# Patient Record
Sex: MALE | Race: WHITE | NOT HISPANIC OR LATINO
[De-identification: names, ages, dates, MRNs, and addresses within clinical notes are randomized per-mention and may not be internally consistent; named-entity substitution may affect disease eponyms.]

---

## 2017-01-17 ENCOUNTER — APPOINTMENT (OUTPATIENT)
Dept: ELECTROPHYSIOLOGY | Facility: CLINIC | Age: 82
End: 2017-01-17

## 2017-04-19 ENCOUNTER — APPOINTMENT (OUTPATIENT)
Dept: ELECTROPHYSIOLOGY | Facility: CLINIC | Age: 82
End: 2017-04-19

## 2017-04-19 VITALS — SYSTOLIC BLOOD PRESSURE: 158 MMHG | DIASTOLIC BLOOD PRESSURE: 81 MMHG | HEART RATE: 60 BPM

## 2017-07-24 ENCOUNTER — APPOINTMENT (OUTPATIENT)
Dept: ELECTROPHYSIOLOGY | Facility: CLINIC | Age: 82
End: 2017-07-24
Payer: MEDICARE

## 2017-07-24 PROCEDURE — 93294 REM INTERROG EVL PM/LDLS PM: CPT

## 2017-07-24 PROCEDURE — 93296 REM INTERROG EVL PM/IDS: CPT

## 2017-10-25 ENCOUNTER — APPOINTMENT (OUTPATIENT)
Dept: ELECTROPHYSIOLOGY | Facility: CLINIC | Age: 82
End: 2017-10-25

## 2017-11-01 ENCOUNTER — APPOINTMENT (OUTPATIENT)
Dept: ELECTROPHYSIOLOGY | Facility: CLINIC | Age: 82
End: 2017-11-01
Payer: MEDICARE

## 2017-11-01 DIAGNOSIS — I49.5 SICK SINUS SYNDROME: ICD-10-CM

## 2017-11-01 PROCEDURE — 93280 PM DEVICE PROGR EVAL DUAL: CPT

## 2018-01-29 ENCOUNTER — APPOINTMENT (OUTPATIENT)
Dept: ELECTROPHYSIOLOGY | Facility: CLINIC | Age: 83
End: 2018-01-29
Payer: MEDICARE

## 2018-01-29 DIAGNOSIS — I48.0 PAROXYSMAL ATRIAL FIBRILLATION: ICD-10-CM

## 2018-01-29 PROCEDURE — 93294 REM INTERROG EVL PM/LDLS PM: CPT

## 2018-01-29 PROCEDURE — 93296 REM INTERROG EVL PM/IDS: CPT

## 2018-04-24 ENCOUNTER — APPOINTMENT (OUTPATIENT)
Dept: ELECTROPHYSIOLOGY | Facility: CLINIC | Age: 83
End: 2018-04-24

## 2018-07-30 ENCOUNTER — APPOINTMENT (OUTPATIENT)
Dept: ELECTROPHYSIOLOGY | Facility: CLINIC | Age: 83
End: 2018-07-30

## 2019-11-18 ENCOUNTER — APPOINTMENT (OUTPATIENT)
Dept: SURGERY | Facility: CLINIC | Age: 84
End: 2019-11-18
Payer: MEDICARE

## 2019-11-18 VITALS
HEART RATE: 84 BPM | BODY MASS INDEX: 26.06 KG/M2 | DIASTOLIC BLOOD PRESSURE: 92 MMHG | TEMPERATURE: 97.8 F | HEIGHT: 69.5 IN | WEIGHT: 180 LBS | SYSTOLIC BLOOD PRESSURE: 137 MMHG

## 2019-11-18 PROCEDURE — 99213 OFFICE O/P EST LOW 20 MIN: CPT

## 2019-11-19 ENCOUNTER — TRANSCRIPTION ENCOUNTER (OUTPATIENT)
Age: 84
End: 2019-11-19

## 2019-11-19 ENCOUNTER — OUTPATIENT (OUTPATIENT)
Dept: OUTPATIENT SERVICES | Facility: HOSPITAL | Age: 84
LOS: 1 days | End: 2019-11-19

## 2019-11-19 VITALS
OXYGEN SATURATION: 98 % | RESPIRATION RATE: 18 BRPM | DIASTOLIC BLOOD PRESSURE: 84 MMHG | TEMPERATURE: 98 F | HEART RATE: 77 BPM | WEIGHT: 182.98 LBS | SYSTOLIC BLOOD PRESSURE: 136 MMHG | HEIGHT: 68 IN

## 2019-11-19 DIAGNOSIS — N60.01 SOLITARY CYST OF RIGHT BREAST: ICD-10-CM

## 2019-11-19 DIAGNOSIS — N63.0 UNSPECIFIED LUMP IN UNSPECIFIED BREAST: ICD-10-CM

## 2019-11-19 DIAGNOSIS — Z98.890 OTHER SPECIFIED POSTPROCEDURAL STATES: Chronic | ICD-10-CM

## 2019-11-19 DIAGNOSIS — Z95.5 PRESENCE OF CORONARY ANGIOPLASTY IMPLANT AND GRAFT: Chronic | ICD-10-CM

## 2019-11-19 LAB
ANION GAP SERPL CALC-SCNC: 9 MMO/L — SIGNIFICANT CHANGE UP (ref 7–14)
BUN SERPL-MCNC: 26 MG/DL — HIGH (ref 7–23)
CALCIUM SERPL-MCNC: 9.9 MG/DL — SIGNIFICANT CHANGE UP (ref 8.4–10.5)
CHLORIDE SERPL-SCNC: 102 MMOL/L — SIGNIFICANT CHANGE UP (ref 98–107)
CO2 SERPL-SCNC: 29 MMOL/L — SIGNIFICANT CHANGE UP (ref 22–31)
CREAT SERPL-MCNC: 1.15 MG/DL — SIGNIFICANT CHANGE UP (ref 0.5–1.3)
GLUCOSE SERPL-MCNC: 67 MG/DL — LOW (ref 70–99)
HCT VFR BLD CALC: 46.7 % — SIGNIFICANT CHANGE UP (ref 39–50)
HGB BLD-MCNC: 15.2 G/DL — SIGNIFICANT CHANGE UP (ref 13–17)
MCHC RBC-ENTMCNC: 32.1 PG — SIGNIFICANT CHANGE UP (ref 27–34)
MCHC RBC-ENTMCNC: 32.5 % — SIGNIFICANT CHANGE UP (ref 32–36)
MCV RBC AUTO: 98.5 FL — SIGNIFICANT CHANGE UP (ref 80–100)
NRBC # FLD: 0 K/UL — SIGNIFICANT CHANGE UP (ref 0–0)
PLATELET # BLD AUTO: 197 K/UL — SIGNIFICANT CHANGE UP (ref 150–400)
PMV BLD: 12 FL — SIGNIFICANT CHANGE UP (ref 7–13)
POTASSIUM SERPL-MCNC: 4.5 MMOL/L — SIGNIFICANT CHANGE UP (ref 3.5–5.3)
POTASSIUM SERPL-SCNC: 4.5 MMOL/L — SIGNIFICANT CHANGE UP (ref 3.5–5.3)
RBC # BLD: 4.74 M/UL — SIGNIFICANT CHANGE UP (ref 4.2–5.8)
RBC # FLD: 12.4 % — SIGNIFICANT CHANGE UP (ref 10.3–14.5)
SODIUM SERPL-SCNC: 140 MMOL/L — SIGNIFICANT CHANGE UP (ref 135–145)
WBC # BLD: 8.59 K/UL — SIGNIFICANT CHANGE UP (ref 3.8–10.5)
WBC # FLD AUTO: 8.59 K/UL — SIGNIFICANT CHANGE UP (ref 3.8–10.5)

## 2019-11-19 NOTE — H&P PST ADULT - RS GEN PE MLT RESP DETAILS PC
clear to auscultation bilaterally/airway patent/breath sounds equal/respirations non-labored/good air movement/no chest wall tenderness/no intercostal retractions

## 2019-11-19 NOTE — H&P PST ADULT - NSICDXPASTSURGICALHX_GEN_ALL_CORE_FT
PAST SURGICAL HISTORY:  Bilat Ing Hernia s/p surgical repair    History of cardiac radiofrequency ablation 9/2019    Lumbar Disc Surgery Spinal stenosis  L5 S1 1996    Pacemaker Insertion 8/26/09 [employer], model #[id number]    RCT (Rotator Cuff Tear) left -, s/p surgical repair    S/P Carpal Tunnel Release left  2008, right 2010    S/P coronary artery stent placement 2016- [facility]    S/P LASIK Surgery 2002 bilateral eyes

## 2019-11-19 NOTE — H&P PST ADULT - NEGATIVE CARDIOVASCULAR SYMPTOMS
no peripheral edema/no orthopnea/no palpitations/no chest pain/no paroxysmal nocturnal dyspnea/no claudication/no dyspnea on exertion

## 2019-11-19 NOTE — H&P PST ADULT - NEGATIVE GENERAL GENITOURINARY SYMPTOMS
no hematuria/no renal colic/no flank pain L/no flank pain R/no urine discoloration/no incontinence/no urinary hesitancy/normal urinary frequency/no bladder infections/no gas in urine/no dysuria

## 2019-11-19 NOTE — H&P PST ADULT - NEGATIVE GENERAL SYMPTOMS
no weight loss/no sweating/no polyphagia/no fatigue/no chills/no polydipsia/no weight gain/no polyuria/no anorexia/no malaise/no fever

## 2019-11-19 NOTE — H&P PST ADULT - NSICDXPASTMEDICALHX_GEN_ALL_CORE_FT
PAST MEDICAL HISTORY:  Atrial Fibrillation     Bilateral Recurrent Inguinal Hernia s/p surgical repair    Depression     Herniated Disc     History of lump in breast right    History of Ventricular Tachycardia     Hyperlipemia     Hypertension     MVP (mitral valve prolapse)     Rotator Cuff Tear left , s/p  surgical repair

## 2019-11-19 NOTE — H&P PST ADULT - NEGATIVE ENMT SYMPTOMS
no post-nasal discharge/no nose bleeds/no recurrent cold sores/no hearing difficulty/no sinus symptoms/no nasal congestion/no nasal obstruction/no ear pain/no nasal discharge/no gum bleeding/no abnormal taste sensation

## 2019-11-19 NOTE — H&P PST ADULT - NEGATIVE NEUROLOGICAL SYMPTOMS
no generalized seizures/no focal seizures/no paresthesias/no facial palsy/no headache/no confusion/no loss of sensation/no difficulty walking/no transient paralysis/no weakness/no syncope/no vertigo/no loss of consciousness/no hemiparesis/no tremors

## 2019-11-19 NOTE — H&P PST ADULT - HISTORY OF PRESENT ILLNESS
83 yo male noted mass to right breat area "couple of weeks ago" pt was referred to Dr. Rizzo and now present in PST for preop evaluation for breast biopsy on 11/20/2019

## 2019-11-19 NOTE — H&P PST ADULT - NSICDXFAMILYHX_GEN_ALL_CORE_FT
FAMILY HISTORY:  Father  Still living? Unknown  Hypertension, Age at diagnosis: Age Unknown    Mother  Still living? Unknown  Hypertension, Age at diagnosis: Age Unknown

## 2019-11-20 ENCOUNTER — RESULT REVIEW (OUTPATIENT)
Age: 84
End: 2019-11-20

## 2019-11-20 ENCOUNTER — APPOINTMENT (OUTPATIENT)
Dept: SURGERY | Facility: AMBULATORY SURGERY CENTER | Age: 84
End: 2019-11-20

## 2019-11-20 ENCOUNTER — OUTPATIENT (OUTPATIENT)
Dept: OUTPATIENT SERVICES | Facility: HOSPITAL | Age: 84
LOS: 1 days | Discharge: HOME | End: 2019-11-20
Payer: MEDICARE

## 2019-11-20 VITALS
TEMPERATURE: 97 F | DIASTOLIC BLOOD PRESSURE: 86 MMHG | SYSTOLIC BLOOD PRESSURE: 152 MMHG | HEIGHT: 68 IN | HEART RATE: 82 BPM | OXYGEN SATURATION: 99 % | WEIGHT: 182.98 LBS | RESPIRATION RATE: 16 BRPM

## 2019-11-20 VITALS — TEMPERATURE: 98 F

## 2019-11-20 DIAGNOSIS — Z95.5 PRESENCE OF CORONARY ANGIOPLASTY IMPLANT AND GRAFT: Chronic | ICD-10-CM

## 2019-11-20 DIAGNOSIS — Z98.890 OTHER SPECIFIED POSTPROCEDURAL STATES: Chronic | ICD-10-CM

## 2019-11-20 DIAGNOSIS — N60.01 SOLITARY CYST OF RIGHT BREAST: ICD-10-CM

## 2019-11-20 PROBLEM — I34.1 NONRHEUMATIC MITRAL (VALVE) PROLAPSE: Chronic | Status: ACTIVE | Noted: 2019-11-19

## 2019-11-20 PROBLEM — Z87.898 PERSONAL HISTORY OF OTHER SPECIFIED CONDITIONS: Chronic | Status: ACTIVE | Noted: 2019-11-19

## 2019-11-20 PROCEDURE — 88305 TISSUE EXAM BY PATHOLOGIST: CPT | Mod: 26

## 2019-11-20 PROCEDURE — 19120 REMOVAL OF BREAST LESION: CPT | Mod: GC

## 2019-11-20 RX ORDER — METOPROLOL TARTRATE 50 MG
1 TABLET ORAL
Qty: 0 | Refills: 0 | DISCHARGE

## 2019-11-20 RX ORDER — APIXABAN 2.5 MG/1
1 TABLET, FILM COATED ORAL
Qty: 0 | Refills: 0 | DISCHARGE

## 2019-11-20 RX ORDER — SODIUM CHLORIDE 9 MG/ML
1000 INJECTION, SOLUTION INTRAVENOUS
Refills: 0 | Status: DISCONTINUED | OUTPATIENT
Start: 2019-11-20 | End: 2019-12-17

## 2019-11-20 RX ORDER — ASPIRIN/CALCIUM CARB/MAGNESIUM 324 MG
1 TABLET ORAL
Qty: 0 | Refills: 0 | DISCHARGE

## 2019-11-20 RX ORDER — ROSUVASTATIN CALCIUM 5 MG/1
1 TABLET ORAL
Qty: 0 | Refills: 0 | DISCHARGE

## 2019-11-20 RX ORDER — ESCITALOPRAM OXALATE 10 MG/1
1 TABLET, FILM COATED ORAL
Qty: 0 | Refills: 0 | DISCHARGE

## 2019-11-20 NOTE — ASU DISCHARGE PLAN (ADULT/PEDIATRIC) - CALL YOUR DOCTOR IF YOU HAVE ANY OF THE FOLLOWING:
Numbness, tingling, color or temperature change to extremity/Increased irritability or sluggishness/Unable to urinate/Inability to tolerate liquids or foods/Bleeding that does not stop/Pain not relieved by Medications

## 2019-11-20 NOTE — ASU DISCHARGE PLAN (ADULT/PEDIATRIC) - CARE PROVIDER_API CALL
Misael Rizzo)  ColonRectal Surgery; Surgery  [address] [place] [address]  Phone: [phone]  Fax: [phone]  Follow Up Time:

## 2019-11-22 LAB — SURGICAL PATHOLOGY STUDY: SIGNIFICANT CHANGE UP

## 2020-03-02 ENCOUNTER — APPOINTMENT (OUTPATIENT)
Dept: ORTHOPEDIC SURGERY | Facility: CLINIC | Age: 85
End: 2020-03-02
Payer: MEDICARE

## 2020-03-02 VITALS
HEART RATE: 93 BPM | BODY MASS INDEX: 26.06 KG/M2 | WEIGHT: 180 LBS | DIASTOLIC BLOOD PRESSURE: 87 MMHG | SYSTOLIC BLOOD PRESSURE: 136 MMHG | HEIGHT: 69.5 IN

## 2020-03-02 DIAGNOSIS — Z86.79 PERSONAL HISTORY OF OTHER DISEASES OF THE CIRCULATORY SYSTEM: ICD-10-CM

## 2020-03-02 DIAGNOSIS — Z78.9 OTHER SPECIFIED HEALTH STATUS: ICD-10-CM

## 2020-03-02 DIAGNOSIS — M19.042 PRIMARY OSTEOARTHRITIS, LEFT HAND: ICD-10-CM

## 2020-03-02 DIAGNOSIS — M65.342 TRIGGER FINGER, LEFT RING FINGER: ICD-10-CM

## 2020-03-02 DIAGNOSIS — M19.041 PRIMARY OSTEOARTHRITIS, RIGHT HAND: ICD-10-CM

## 2020-03-02 PROCEDURE — 20550 NJX 1 TENDON SHEATH/LIGAMENT: CPT | Mod: F3

## 2020-03-02 PROCEDURE — 99203 OFFICE O/P NEW LOW 30 MIN: CPT | Mod: 25

## 2023-05-18 ENCOUNTER — NON-APPOINTMENT (OUTPATIENT)
Age: 88
End: 2023-05-18

## 2023-08-08 ENCOUNTER — APPOINTMENT (OUTPATIENT)
Dept: OPHTHALMOLOGY | Facility: CLINIC | Age: 88
End: 2023-08-08
Payer: MEDICARE

## 2023-08-08 ENCOUNTER — NON-APPOINTMENT (OUTPATIENT)
Age: 88
End: 2023-08-08

## 2023-08-08 PROCEDURE — 92004 COMPRE OPH EXAM NEW PT 1/>: CPT

## 2023-09-07 ENCOUNTER — NON-APPOINTMENT (OUTPATIENT)
Age: 88
End: 2023-09-07

## 2023-09-13 ENCOUNTER — NON-APPOINTMENT (OUTPATIENT)
Age: 88
End: 2023-09-13

## 2023-09-13 ENCOUNTER — APPOINTMENT (OUTPATIENT)
Dept: ORTHOPEDIC SURGERY | Facility: CLINIC | Age: 88
End: 2023-09-13
Payer: MEDICARE

## 2023-09-13 VITALS
HEIGHT: 69 IN | HEART RATE: 80 BPM | BODY MASS INDEX: 26.66 KG/M2 | DIASTOLIC BLOOD PRESSURE: 78 MMHG | SYSTOLIC BLOOD PRESSURE: 110 MMHG | WEIGHT: 180 LBS

## 2023-09-13 DIAGNOSIS — M65.322 TRIGGER FINGER, LEFT INDEX FINGER: ICD-10-CM

## 2023-09-13 PROCEDURE — 99203 OFFICE O/P NEW LOW 30 MIN: CPT | Mod: 25

## 2023-09-13 PROCEDURE — 20550 NJX 1 TENDON SHEATH/LIGAMENT: CPT | Mod: LT

## 2023-09-13 RX ORDER — LIDOCAINE HYDROCHLORIDE 10 MG/ML
1 INJECTION, SOLUTION INFILTRATION; PERINEURAL
Refills: 0 | Status: COMPLETED | OUTPATIENT
Start: 2023-09-13

## 2023-09-13 RX ORDER — BETAMETHA AC,SOD PHOS/WATER/PF 6 MG/ML
6 (3-3) VIAL (ML) INJECTION
Qty: 1 | Refills: 0 | Status: COMPLETED | OUTPATIENT
Start: 2023-09-13

## 2023-09-13 RX ADMIN — LIDOCAINE HYDROCHLORIDE 0.5 %: 10 INJECTION, SOLUTION INFILTRATION; PERINEURAL at 00:00

## 2023-09-13 RX ADMIN — BETAMETHASONE ACETATE AND BETAMETHASONE SODIUM PHOSPHATE 1 MG/ML: 3; 3 INJECTION, SUSPENSION INTRA-ARTICULAR; INTRALESIONAL; INTRAMUSCULAR; SOFT TISSUE at 00:00

## 2023-10-04 ENCOUNTER — APPOINTMENT (OUTPATIENT)
Dept: ORTHOPEDIC SURGERY | Facility: CLINIC | Age: 88
End: 2023-10-04
Payer: MEDICARE

## 2023-10-04 VITALS — HEIGHT: 69 IN | BODY MASS INDEX: 26.66 KG/M2 | WEIGHT: 180 LBS

## 2023-10-04 DIAGNOSIS — M54.16 RADICULOPATHY, LUMBAR REGION: ICD-10-CM

## 2023-10-04 PROCEDURE — 99214 OFFICE O/P EST MOD 30 MIN: CPT

## 2024-06-10 ENCOUNTER — APPOINTMENT (OUTPATIENT)
Dept: PULMONOLOGY | Facility: CLINIC | Age: 89
End: 2024-06-10
Payer: MEDICARE

## 2024-06-10 VITALS — DIASTOLIC BLOOD PRESSURE: 84 MMHG | HEART RATE: 76 BPM | SYSTOLIC BLOOD PRESSURE: 138 MMHG | OXYGEN SATURATION: 94 %

## 2024-06-10 VITALS — WEIGHT: 178 LBS | HEIGHT: 69.5 IN | BODY MASS INDEX: 25.77 KG/M2

## 2024-06-10 DIAGNOSIS — I48.91 UNSPECIFIED ATRIAL FIBRILLATION: ICD-10-CM

## 2024-06-10 PROCEDURE — 71046 X-RAY EXAM CHEST 2 VIEWS: CPT

## 2024-06-10 PROCEDURE — 95012 NITRIC OXIDE EXP GAS DETER: CPT

## 2024-06-10 PROCEDURE — 99203 OFFICE O/P NEW LOW 30 MIN: CPT | Mod: 25

## 2024-06-10 PROCEDURE — 94060 EVALUATION OF WHEEZING: CPT

## 2024-06-10 RX ORDER — METHYLPREDNISOLONE 4 MG/1
4 TABLET ORAL
Qty: 1 | Refills: 1 | Status: DISCONTINUED | COMMUNITY
Start: 2023-10-04 | End: 2024-06-10

## 2024-06-10 RX ORDER — CEFPODOXIME PROXETIL 200 MG/1
200 TABLET, FILM COATED ORAL
Qty: 20 | Refills: 0 | Status: ACTIVE | COMMUNITY
Start: 2024-06-10 | End: 1900-01-01

## 2024-06-10 RX ORDER — DILTIAZEM HYDROCHLORIDE 180 MG/1
180 TABLET, EXTENDED RELEASE ORAL
Refills: 0 | Status: ACTIVE | COMMUNITY
Start: 2024-06-10

## 2024-06-10 RX ORDER — ATORVASTATIN CALCIUM 80 MG/1
TABLET, FILM COATED ORAL
Refills: 0 | Status: DISCONTINUED | COMMUNITY
End: 2024-06-10

## 2024-06-10 RX ORDER — SILODOSIN 8 MG/1
8 CAPSULE ORAL
Refills: 0 | Status: ACTIVE | COMMUNITY
Start: 2024-06-10

## 2024-06-11 PROBLEM — I48.91 ATRIAL FIBRILLATION, UNSPECIFIED TYPE: Status: ACTIVE | Noted: 2024-06-10

## 2024-06-11 NOTE — ASSESSMENT
[FreeTextEntry1] : Mr. ESTHER GOMEZ is an 89 year old male, history of Hypertension, Hyperlipidemia, atrial fibrillation. Reviewed recent chest x-ray which demonstrated an elevated left diaphragm, no indication of pneumonia. Chest x-ray today demonstrates no change compared to prior. Cough likely secondary to acute viral infection but appears to be improving with antibiotics.  Will need follow-up PFT

## 2024-06-11 NOTE — ADDENDUM
[FreeTextEntry1] : I, Magdalena Nelli, acted solely as a scribe for Dr. Enoc Velasquez M.D. on this date 06/10/2024.   All medical record entries made by the Scribe were at my, Dr. Enoc Velasquez M.D., direction and personally dictated by me on 06/10/2024. I have reviewed the chart and agree that the record accurately reflects my personal performance of the history, physical exam, assessment and plan. I have also personally directed, reviewed, and agreed with the chart.

## 2024-06-11 NOTE — HISTORY OF PRESENT ILLNESS
[Former] : former [TextBox_4] : ESTHER GOMEZ is a 89 year male with history of Hypertension, Hyperlipidemia, atrial fibrillation, who presents to the office for an initial evaluation. Patient reports of having symptoms of a cough that started 1 week ago which has been improving after being started on a course of antibiotics. He states that he currently takes Flonase on a daily basis. Patient reports that he is currently not taking any maintenance inhalers at this time. He denies of having any known Hx of pulmonary diseases.   He has a known elevated left hemidiaphragm based on a prior evaluation in 2018. [YearQuit] : 65

## 2024-07-01 ENCOUNTER — APPOINTMENT (OUTPATIENT)
Dept: PULMONOLOGY | Facility: CLINIC | Age: 89
End: 2024-07-01
Payer: MEDICARE

## 2024-07-01 VITALS — HEART RATE: 80 BPM | OXYGEN SATURATION: 96 % | SYSTOLIC BLOOD PRESSURE: 132 MMHG | DIASTOLIC BLOOD PRESSURE: 80 MMHG

## 2024-07-01 DIAGNOSIS — R05.1 ACUTE COUGH: ICD-10-CM

## 2024-07-01 LAB — POCT - HEMOGLOBIN (HGB), QUANTITATIVE, TRANSCUTANEOUS: 14.3

## 2024-07-01 PROCEDURE — 94727 GAS DIL/WSHOT DETER LNG VOL: CPT

## 2024-07-01 PROCEDURE — 99213 OFFICE O/P EST LOW 20 MIN: CPT | Mod: 25

## 2024-07-01 PROCEDURE — ZZZZZ: CPT

## 2024-07-01 PROCEDURE — 88738 HGB QUANT TRANSCUTANEOUS: CPT

## 2024-07-01 PROCEDURE — 94010 BREATHING CAPACITY TEST: CPT

## 2024-07-01 PROCEDURE — 94729 DIFFUSING CAPACITY: CPT

## 2025-02-11 ENCOUNTER — APPOINTMENT (OUTPATIENT)
Dept: UROLOGY | Facility: CLINIC | Age: 89
End: 2025-02-11

## 2025-04-14 ENCOUNTER — NON-APPOINTMENT (OUTPATIENT)
Age: 89
End: 2025-04-14

## 2025-04-17 ENCOUNTER — APPOINTMENT (OUTPATIENT)
Dept: PULMONOLOGY | Facility: CLINIC | Age: 89
End: 2025-04-17
Payer: MEDICARE

## 2025-04-17 VITALS
HEART RATE: 65 BPM | BODY MASS INDEX: 26.06 KG/M2 | DIASTOLIC BLOOD PRESSURE: 69 MMHG | SYSTOLIC BLOOD PRESSURE: 108 MMHG | HEIGHT: 69.5 IN | OXYGEN SATURATION: 96 % | WEIGHT: 180 LBS

## 2025-04-17 DIAGNOSIS — R04.2 HEMOPTYSIS: ICD-10-CM

## 2025-04-17 PROCEDURE — 71046 X-RAY EXAM CHEST 2 VIEWS: CPT

## 2025-04-17 PROCEDURE — G2211 COMPLEX E/M VISIT ADD ON: CPT

## 2025-04-17 PROCEDURE — 99214 OFFICE O/P EST MOD 30 MIN: CPT

## 2025-04-24 ENCOUNTER — APPOINTMENT (OUTPATIENT)
Dept: PULMONOLOGY | Facility: CLINIC | Age: 89
End: 2025-04-24

## 2025-04-25 ENCOUNTER — APPOINTMENT (OUTPATIENT)
Dept: PULMONOLOGY | Facility: CLINIC | Age: 89
End: 2025-04-25

## 2025-05-30 ENCOUNTER — INPATIENT (INPATIENT)
Facility: HOSPITAL | Age: 89
LOS: 4 days | Discharge: HOME | End: 2025-06-04
Attending: THORACIC SURGERY (CARDIOTHORACIC VASCULAR SURGERY) | Admitting: THORACIC SURGERY (CARDIOTHORACIC VASCULAR SURGERY)
Payer: MEDICARE

## 2025-05-30 ENCOUNTER — APPOINTMENT (OUTPATIENT)
Dept: PULMONOLOGY | Facility: CLINIC | Age: 89
End: 2025-05-30
Payer: MEDICARE

## 2025-05-30 VITALS
SYSTOLIC BLOOD PRESSURE: 130 MMHG | HEART RATE: 68 BPM | DIASTOLIC BLOOD PRESSURE: 88 MMHG | TEMPERATURE: 98 F | RESPIRATION RATE: 18 BRPM | HEIGHT: 69 IN | WEIGHT: 179.9 LBS | OXYGEN SATURATION: 95 %

## 2025-05-30 VITALS — DIASTOLIC BLOOD PRESSURE: 61 MMHG | SYSTOLIC BLOOD PRESSURE: 97 MMHG | OXYGEN SATURATION: 96 % | HEART RATE: 81 BPM

## 2025-05-30 DIAGNOSIS — R06.02 SHORTNESS OF BREATH: ICD-10-CM

## 2025-05-30 DIAGNOSIS — Z95.5 PRESENCE OF CORONARY ANGIOPLASTY IMPLANT AND GRAFT: Chronic | ICD-10-CM

## 2025-05-30 DIAGNOSIS — J90 PLEURAL EFFUSION, NOT ELSEWHERE CLASSIFIED: ICD-10-CM

## 2025-05-30 DIAGNOSIS — R91.8 OTHER NONSPECIFIC ABNORMAL FINDING OF LUNG FIELD: ICD-10-CM

## 2025-05-30 DIAGNOSIS — Z98.890 OTHER SPECIFIED POSTPROCEDURAL STATES: Chronic | ICD-10-CM

## 2025-05-30 DIAGNOSIS — Z96.641 PRESENCE OF RIGHT ARTIFICIAL HIP JOINT: Chronic | ICD-10-CM

## 2025-05-30 LAB
ALBUMIN SERPL ELPH-MCNC: 3.5 G/DL — SIGNIFICANT CHANGE UP (ref 3.3–5)
ALP SERPL-CCNC: 83 U/L — SIGNIFICANT CHANGE UP (ref 40–120)
ALT FLD-CCNC: 13 U/L — SIGNIFICANT CHANGE UP (ref 4–41)
ANION GAP SERPL CALC-SCNC: 10 MMOL/L — SIGNIFICANT CHANGE UP (ref 7–14)
APTT BLD: 33.8 SEC — SIGNIFICANT CHANGE UP (ref 26.1–36.8)
AST SERPL-CCNC: 21 U/L — SIGNIFICANT CHANGE UP (ref 4–40)
BASOPHILS # BLD AUTO: 0.05 K/UL — SIGNIFICANT CHANGE UP (ref 0–0.2)
BASOPHILS NFR BLD AUTO: 0.6 % — SIGNIFICANT CHANGE UP (ref 0–2)
BILIRUB SERPL-MCNC: 0.8 MG/DL — SIGNIFICANT CHANGE UP (ref 0.2–1.2)
BLOOD GAS VENOUS COMPREHENSIVE RESULT: SIGNIFICANT CHANGE UP
BUN SERPL-MCNC: 21 MG/DL — SIGNIFICANT CHANGE UP (ref 7–23)
CALCIUM SERPL-MCNC: 9.4 MG/DL — SIGNIFICANT CHANGE UP (ref 8.4–10.5)
CHLORIDE SERPL-SCNC: 103 MMOL/L — SIGNIFICANT CHANGE UP (ref 98–107)
CO2 SERPL-SCNC: 27 MMOL/L — SIGNIFICANT CHANGE UP (ref 22–31)
CREAT SERPL-MCNC: 1.17 MG/DL — SIGNIFICANT CHANGE UP (ref 0.5–1.3)
EGFR: 59 ML/MIN/1.73M2 — LOW
EGFR: 59 ML/MIN/1.73M2 — LOW
EOSINOPHIL # BLD AUTO: 0.23 K/UL — SIGNIFICANT CHANGE UP (ref 0–0.5)
EOSINOPHIL NFR BLD AUTO: 2.6 % — SIGNIFICANT CHANGE UP (ref 0–6)
GLUCOSE SERPL-MCNC: 84 MG/DL — SIGNIFICANT CHANGE UP (ref 70–99)
HCT VFR BLD CALC: 35.2 % — LOW (ref 39–50)
HGB BLD-MCNC: 11.8 G/DL — LOW (ref 13–17)
IANC: 6.77 K/UL — SIGNIFICANT CHANGE UP (ref 1.8–7.4)
IMM GRANULOCYTES NFR BLD AUTO: 0.7 % — SIGNIFICANT CHANGE UP (ref 0–0.9)
INR BLD: 1.31 RATIO — HIGH (ref 0.85–1.16)
LYMPHOCYTES # BLD AUTO: 0.9 K/UL — LOW (ref 1–3.3)
LYMPHOCYTES # BLD AUTO: 10.2 % — LOW (ref 13–44)
MCHC RBC-ENTMCNC: 32.5 PG — SIGNIFICANT CHANGE UP (ref 27–34)
MCHC RBC-ENTMCNC: 33.5 G/DL — SIGNIFICANT CHANGE UP (ref 32–36)
MCV RBC AUTO: 97 FL — SIGNIFICANT CHANGE UP (ref 80–100)
MONOCYTES # BLD AUTO: 0.79 K/UL — SIGNIFICANT CHANGE UP (ref 0–0.9)
MONOCYTES NFR BLD AUTO: 9 % — SIGNIFICANT CHANGE UP (ref 2–14)
NEUTROPHILS # BLD AUTO: 6.77 K/UL — SIGNIFICANT CHANGE UP (ref 1.8–7.4)
NEUTROPHILS NFR BLD AUTO: 76.9 % — SIGNIFICANT CHANGE UP (ref 43–77)
NRBC # BLD AUTO: 0 K/UL — SIGNIFICANT CHANGE UP (ref 0–0)
NRBC # FLD: 0 K/UL — SIGNIFICANT CHANGE UP (ref 0–0)
NRBC BLD AUTO-RTO: 0 /100 WBCS — SIGNIFICANT CHANGE UP (ref 0–0)
NT-PROBNP SERPL-SCNC: 1486 PG/ML — HIGH
PLATELET # BLD AUTO: 315 K/UL — SIGNIFICANT CHANGE UP (ref 150–400)
POTASSIUM SERPL-MCNC: 4.2 MMOL/L — SIGNIFICANT CHANGE UP (ref 3.5–5.3)
POTASSIUM SERPL-SCNC: 4.2 MMOL/L — SIGNIFICANT CHANGE UP (ref 3.5–5.3)
PROT SERPL-MCNC: 6.8 G/DL — SIGNIFICANT CHANGE UP (ref 6–8.3)
PROTHROM AB SERPL-ACNC: 15.6 SEC — HIGH (ref 9.9–13.4)
RBC # BLD: 3.63 M/UL — LOW (ref 4.2–5.8)
RBC # FLD: 13.5 % — SIGNIFICANT CHANGE UP (ref 10.3–14.5)
SODIUM SERPL-SCNC: 140 MMOL/L — SIGNIFICANT CHANGE UP (ref 135–145)
TROPONIN T, HIGH SENSITIVITY RESULT: 36 NG/L — SIGNIFICANT CHANGE UP
WBC # BLD: 8.8 K/UL — SIGNIFICANT CHANGE UP (ref 3.8–10.5)
WBC # FLD AUTO: 8.8 K/UL — SIGNIFICANT CHANGE UP (ref 3.8–10.5)

## 2025-05-30 PROCEDURE — 99285 EMERGENCY DEPT VISIT HI MDM: CPT | Mod: GC

## 2025-05-30 PROCEDURE — 71275 CT ANGIOGRAPHY CHEST: CPT | Mod: 26

## 2025-05-30 PROCEDURE — 99222 1ST HOSP IP/OBS MODERATE 55: CPT

## 2025-05-30 PROCEDURE — G2211 COMPLEX E/M VISIT ADD ON: CPT | Mod: PD

## 2025-05-30 PROCEDURE — 99215 OFFICE O/P EST HI 40 MIN: CPT

## 2025-05-30 RX ORDER — LOSARTAN POTASSIUM 100 MG/1
25 TABLET, FILM COATED ORAL DAILY
Refills: 0 | Status: DISCONTINUED | OUTPATIENT
Start: 2025-05-31 | End: 2025-06-04

## 2025-05-30 RX ORDER — PIPERACILLIN-TAZO-DEXTROSE,ISO 3.375G/5
3.38 IV SOLUTION, PIGGYBACK PREMIX FROZEN(ML) INTRAVENOUS EVERY 8 HOURS
Refills: 0 | Status: DISCONTINUED | OUTPATIENT
Start: 2025-05-31 | End: 2025-06-04

## 2025-05-30 RX ORDER — SILODOSIN 4 MG/1
1 CAPSULE ORAL
Refills: 0 | DISCHARGE

## 2025-05-30 RX ORDER — PIPERACILLIN-TAZO-DEXTROSE,ISO 3.375G/5
3.38 IV SOLUTION, PIGGYBACK PREMIX FROZEN(ML) INTRAVENOUS ONCE
Refills: 0 | Status: COMPLETED | OUTPATIENT
Start: 2025-05-31 | End: 2025-05-31

## 2025-05-30 RX ORDER — SENNA 187 MG
2 TABLET ORAL AT BEDTIME
Refills: 0 | Status: DISCONTINUED | OUTPATIENT
Start: 2025-05-30 | End: 2025-06-04

## 2025-05-30 RX ORDER — FUROSEMIDE 10 MG/ML
1 INJECTION INTRAMUSCULAR; INTRAVENOUS
Refills: 0 | DISCHARGE

## 2025-05-30 RX ORDER — LOSARTAN POTASSIUM 100 MG/1
1 TABLET, FILM COATED ORAL
Refills: 0 | DISCHARGE

## 2025-05-30 RX ORDER — TAMSULOSIN HYDROCHLORIDE 0.4 MG/1
0.8 CAPSULE ORAL AT BEDTIME
Refills: 0 | Status: DISCONTINUED | OUTPATIENT
Start: 2025-05-30 | End: 2025-06-04

## 2025-05-30 RX ORDER — ESCITALOPRAM OXALATE 20 MG/1
10 TABLET ORAL DAILY
Refills: 0 | Status: DISCONTINUED | OUTPATIENT
Start: 2025-05-30 | End: 2025-06-04

## 2025-05-30 RX ORDER — POLYETHYLENE GLYCOL 3350 17 G/17G
17 POWDER, FOR SOLUTION ORAL DAILY
Refills: 0 | Status: DISCONTINUED | OUTPATIENT
Start: 2025-05-30 | End: 2025-06-04

## 2025-05-30 RX ORDER — PIPERACILLIN-TAZO-DEXTROSE,ISO 3.375G/5
3.38 IV SOLUTION, PIGGYBACK PREMIX FROZEN(ML) INTRAVENOUS ONCE
Refills: 0 | Status: COMPLETED | OUTPATIENT
Start: 2025-05-30 | End: 2025-05-30

## 2025-05-30 RX ORDER — ROSUVASTATIN CALCIUM 20 MG/1
10 TABLET, FILM COATED ORAL DAILY
Refills: 0 | Status: DISCONTINUED | OUTPATIENT
Start: 2025-05-30 | End: 2025-06-04

## 2025-05-30 RX ORDER — HEPARIN SODIUM 1000 [USP'U]/ML
5000 INJECTION INTRAVENOUS; SUBCUTANEOUS EVERY 8 HOURS
Refills: 0 | Status: DISCONTINUED | OUTPATIENT
Start: 2025-05-30 | End: 2025-06-01

## 2025-05-30 RX ORDER — FUROSEMIDE 10 MG/ML
20 INJECTION INTRAMUSCULAR; INTRAVENOUS
Refills: 0 | Status: DISCONTINUED | OUTPATIENT
Start: 2025-05-31 | End: 2025-06-04

## 2025-05-30 RX ORDER — BISACODYL 5 MG
10 TABLET, DELAYED RELEASE (ENTERIC COATED) ORAL DAILY
Refills: 0 | Status: DISCONTINUED | OUTPATIENT
Start: 2025-05-30 | End: 2025-06-04

## 2025-05-30 RX ORDER — DILTIAZEM HYDROCHLORIDE 240 MG/1
1 TABLET, EXTENDED RELEASE ORAL
Refills: 0 | DISCHARGE

## 2025-05-30 RX ORDER — DILTIAZEM HYDROCHLORIDE 240 MG/1
180 TABLET, EXTENDED RELEASE ORAL DAILY
Refills: 0 | Status: DISCONTINUED | OUTPATIENT
Start: 2025-05-31 | End: 2025-06-04

## 2025-05-30 RX ORDER — ROSUVASTATIN CALCIUM 20 MG/1
10 TABLET, FILM COATED ORAL AT BEDTIME
Refills: 0 | Status: DISCONTINUED | OUTPATIENT
Start: 2025-05-30 | End: 2025-05-30

## 2025-05-30 RX ADMIN — HEPARIN SODIUM 5000 UNIT(S): 1000 INJECTION INTRAVENOUS; SUBCUTANEOUS at 21:37

## 2025-05-30 RX ADMIN — Medication 200 GRAM(S): at 16:38

## 2025-05-30 RX ADMIN — Medication 25 GRAM(S): at 20:20

## 2025-05-30 RX ADMIN — TAMSULOSIN HYDROCHLORIDE 0.8 MILLIGRAM(S): 0.4 CAPSULE ORAL at 21:36

## 2025-05-30 RX ADMIN — Medication 2 TABLET(S): at 21:36

## 2025-05-30 NOTE — ED PROVIDER NOTE - CARE PLAN
1 Principal Discharge DX:	Shortness of breath   Principal Discharge DX:	Pleural effusion  Secondary Diagnosis:	SOB (shortness of breath)

## 2025-05-30 NOTE — ED PROVIDER NOTE - ATTENDING CONTRIBUTION TO CARE
Pt was seen and evaluated by me. Pt is a 89 y/o male with PMHx of Bladder CA, CHF, CAD s/p stents, HTN, HLD, A-fib on Eliquis who presented to the ED after abnormal CT yesterday. Pt states he had a CT yesterday and it showed a pleural effusion/abscess with enlarging 1.8 cm soft tissue nodule at anterior left upper pleural service suspicious for metastasis. Pt was advised to come to the ED by  Dr. Price for further work up. Pt denies any fever, chills, nausea, vomiting, worsening SOB, chest pain, or abd pain.   VITALS: Vitals have been reviewed.  GEN APPEARANCE: Alert and cooperative, non-toxic appearing and in NAD  HEAD: Atraumatic, normocephalic.   EYES: PERRL.   EARS: Gross hearing intact.   NOSE: No nasal discharge.   THROAT: MMM. Oral cavity and pharynx normal.   CV: RRR, S1S2, no c/r/m/g. No cyanosis or pallor.   LUNGS: Decreased lung sounds at left base  ABDOMEN: Soft, NTND. No guarding or rebound.   MSK/EXT: Spine appears normal, no spine point tenderness. 4 extremities.   SKIN: Normal color for race, warm, dry and intact. No evidence of rash.  89 y/o male with PMHx of Bladder CA, CHF, CAD s/p stents, HTN, HLD, A-fib on Eliquis who presented to the ED after abnormal CT yesterday.   Concern for Pleural Effusion, Lung CA, PE  Will get labs and CT. Will require admission for further work up/management

## 2025-05-30 NOTE — H&P ADULT - NSICDXPASTSURGICALHX_GEN_ALL_CORE_FT
PAST SURGICAL HISTORY:  Bilat Ing Hernia s/p surgical repair    History of cardiac radiofrequency ablation 9/2019    History of total hip replacement, right     Lumbar Disc Surgery Spinal stenosis  L5 S1 1996    Pacemaker Insertion 8/26/09 [employer], model #[id number]    RCT (Rotator Cuff Tear) left -, s/p surgical repair    S/P Carpal Tunnel Release left  2008, right 2010    S/P coronary artery stent placement 2016- [facility]    S/P LASIK Surgery 2002 bilateral eyes    S/P mitral valve clip implantation

## 2025-05-30 NOTE — ED PROVIDER NOTE - NSICDXPASTSURGICALHX_GEN_ALL_CORE_FT
PAST SURGICAL HISTORY:  Bilat Ing Hernia s/p surgical repair    History of cardiac radiofrequency ablation 9/2019    Lumbar Disc Surgery Spinal stenosis  L5 S1 1996    Pacemaker Insertion 8/26/09 [employer] model #[id number]    RCT (Rotator Cuff Tear) left -, s/p surgical repair    S/P Carpal Tunnel Release left  2008, right 2010    S/P coronary artery stent placement 2016- [facility]    S/P LASIK Surgery 2002 bilateral eyes

## 2025-05-30 NOTE — ED PROVIDER NOTE - OBJECTIVE STATEMENT
89 year-old male patient past medical history of stage I bladder cancer, ischemic heart failure, CAD status post stents, hypertension, hyperlipidemia, A-fib compliant on Eliquis presents to ED for new CT chest with IV contrast findings on outpatient radiology ordered by pulmonologist Dr. Emerson Price.  CT chest on 5/29/25 showed new small left pleural effusion to left lower lobe loculated in appearance concern for lung abscess as well as enlarging 1.8 cm soft tissue nodule at anterior left upper pleural service suspicious for metastasis.  Dr. Price sent patient in for repeat imaging and to get evaluated by CT surgery for possible pleural fluid cytology.    Pt reports low grade temp 99, overall fatigue, decreased appetite. No recent weight loss. No chest pain. Endorses chronic sob, increased for the past year and states most likely due to HF.

## 2025-05-30 NOTE — H&P ADULT - NSICDXPASTMEDICALHX_GEN_ALL_CORE_FT
PAST MEDICAL HISTORY:  Atrial Fibrillation     Bilateral Recurrent Inguinal Hernia s/p surgical repair    Bladder cancer     Depression     Herniated Disc     History of lump in breast right    History of Ventricular Tachycardia     Hyperlipemia     Hypertension     MVP (mitral valve prolapse)     Rotator Cuff Tear left , s/p  surgical repair

## 2025-05-30 NOTE — ED PROVIDER NOTE - CONSIDERATION OF ADMISSION OBSERVATION
Consideration of Admission/Observation Given concern for pleural effusion/mass, will require admission for further work up/management

## 2025-05-30 NOTE — ED ADULT NURSE NOTE - OBJECTIVE STATEMENT
Pt received to rm 6 presents for ct scan of lungs as advised by his PMD. A&ox4, ambulatory at baseline skin intact respirations even and unlabored abd soft and nondistended nontender to palpation. 20g IV placed in rt arm labs drawn and sent, NSr on CM, endorsing mild productive cough ,states PMD concerned for PNA. denies chest pain, fever, chills, n/v, or any other symptoms.

## 2025-05-30 NOTE — ED PROVIDER NOTE - CLINICAL SUMMARY MEDICAL DECISION MAKING FREE TEXT BOX
Patient is afebrile, hemodynamically stable, saturating appropriately on room air, in no acute distress, speaking complete sentences.  Vitals nonactionable at this time.  CT chest with IV contrast on 5/29/2025 showed concern for nodule (malignancy), pleural effusion versus lung abscess to left lung.  Sent in by pulmonologist and requesting CT surgery consult.  Patient states he has increased shortness of breath chronically for the past year.  Etiology may be due to heart failure versus pulmonary cancer versus lung abscess.  Will repeat imaging for CT surgery however will get CTA chest to eval for PE since patient has history of bladder cancer.

## 2025-05-30 NOTE — ED PROVIDER NOTE - PHYSICAL EXAMINATION
PHYSICAL EXAM:    GENERAL: NAD  HEENT:  Atraumatic  CHEST/LUNG: Chest rise equal bilaterally, clear breath sounds to right lung, diminished to left lung  HEART: Regular rate and rhythm  EXTREMITIES:  Extremities warm  PSYCH: A&Ox3  SKIN: No obvious rashes or lesions

## 2025-05-30 NOTE — H&P ADULT - ASSESSMENT
89yo M with h/o Afib, heart failure, MVR, s/p Mitral clip, PPM, HLD, HLD, depression, [place], Bladder CA (currently undergoing weekly intra bladder chemo) has been seeing his outpt PCP and Pulmonologist( Dr. Price) for 1 month of cough, general malaise, low grade temp now found to have new small left pleural effusion to left lower lobe loculated in appearance concern for lung abscess as well as enlarging 1.8 cm soft tissue nodule at anterior left upper pleural service suspicious for metastasis.    Plan:  -As per Dr. Moore, admit to his service, Thoracic surgery [address] [place]  -F/u Labs  -F/u CT scan of chest  -Will start on Zosyn for broad spectrum coverage  -Echo  -EKG  -Will hold Eliquis for now pending possible surgical intervention  Above d/w pt and ER team.

## 2025-05-30 NOTE — H&P ADULT - NSHPPHYSICALEXAM_GEN_ALL_CORE
General: WD NAD  Neurology: A&Ox3, nonfocal, SANTIZO x 4  Eyes: PERRLA/ EOMI, Gross vision intact  ENT/Neck: Neck supple, trachea midline, No JVD, Gross hearing intact + [place], wearing bilat hearing aides  Respiratory: dec'd BS left mid to base with fine crackles  CV: RRR, S1S2, no murmurs, rubs or gallops  Abdominal: Soft, NT, ND +BS,   Extremities: Bilat compression stockings in place. Trace edema bilat, + peripheral pulses  Skin: No Rashes, Hematoma, Ecchymosis

## 2025-05-30 NOTE — CONSULT NOTE ADULT - ASSESSMENT
-----------------------------------------------------------  Interventional Radiology Brief Consult Note  -----------------------------------------------------------    Reason for Referral: Left pleural effusion drainage     Clinical Summary: 89y Male with pmh of afib, heart failure, MVR s/p mitral clip, PPM, HLD, depression, bladder CA (intra-bladder chemo weekly) who had cough, general malaise and low grade fevers for the past month and was found to have a left lower lobe loculated pleural effusion. IR is consulted for left pleural effusion drainage.     Vitals:  T(F): 98.3 (05-30-25 @ 15:45), Max: 98.3 (05-30-25 @ 15:45)  HR: 84 (05-30-25 @ 15:45) (68 - 94)  BP: 139/84 (05-30-25 @ 15:45) (130/88 - 146/80)  RR: 18 (05-30-25 @ 15:45) (17 - 18)  SpO2: 96% (05-30-25 @ 15:45) (95% - 97%)    Labs:           11.8  8.80)-----(315     (05-30-25 @ 12:40)         35.2     140 | 103 | 21  --------------------< 84     (05-30-25 @ 12:40)  4.2 | 27 | 1.17       PT: 15.6[H] 05-30-25 @ 12:40  aPTT: 33.8 05-30-25 @ 12:40   INR: 1.31[H] 05-30-25 @ 12:40    Imaging: CT chest 5/30/25 was reviewed     Assessment: 89y Male with history of bladder CA and low grade fevers, cough and general malaise with loculated left pleural effusion. IR is consulted for left pleural effusion drainage.     Recommendations:  - Plan for left pleural effusion drainage on Monday. Place IR procedure order under Dr. Dickey (procedure will not be added to the schedule without this order).   - Updated cbc, bmp and coags.   - NPO at midnight prior to procedure.   - Hold subq heparin night prior to and morning of procedure. Eliquis to be held 72 hours prior to procedure.   - Write IR pre procedure note.     --  Beverly Cunningham MD  Interventional Radiology Resident (PGY-6)  Available on [employer] TEAMS    For EMERGENT inquiries/questions:  IR Pager ([facility]): [phone]  IR Pager ([facility]): [phone] ; [phone]    For non-emergent consults/questions:   Please place a sunrise order "Consult- Interventional Radiology" with an appropriate callback number    For questions about scheduling during appropriate work hours, call IR :  [facility]: [phone]  [facility]: [phone]    For outpatient IR booking:  [facility]: [phone]  [facility]: [phone]

## 2025-05-30 NOTE — PATIENT PROFILE ADULT - FALL HARM RISK - HARM RISK INTERVENTIONS

## 2025-05-30 NOTE — ED ADULT NURSE NOTE - CHIEF COMPLAINT QUOTE
pt c/o SOB x 2 weeks, sent from MD office for CT due to nodules found on lungs.  As per pt low grade fever at home.  Hx of HTN, A.fibb, pacemaker. Pt takes Eliquis daily.

## 2025-05-30 NOTE — ED ADULT TRIAGE NOTE - CHIEF COMPLAINT QUOTE
pt c/o SOB x 2 weeks, sent from MD office for CT due to nodules found on lungs.  As per pt low grade fever at home.  Hx of HTN, A.fibb. Pt takes Eliquis daily. pt c/o SOB x 2 weeks, sent from MD office for CT due to nodules found on lungs.  As per pt low grade fever at home.  Hx of HTN, A.fibb, pacemaker. Pt takes Eliquis daily.

## 2025-05-30 NOTE — H&P ADULT - HISTORY OF PRESENT ILLNESS
89yo M with h/o Afib, heart failure, MVR, s/p Mitral clip, PPM, HLD, HLD, depression, [place], Bladder CA (currently undergoing weekly intra bladder chemo) has been seeing his outpt PCP and Pulmonologist( Dr. Price) for 1 month c/o generally not feeling well. Pt states 3-4 wks ago he felt like he was sick with a cold or URI, experiencing cough, low grade fever, malaise and has continued to have cough with fatigue and occasional SOB since. Pt states cough is mostly dry, worse at night. Occasionally productive for sml amt of sputum. Pt states low grade temps of 99.0-100.0 on and off for past week. Denies any HA, night sweats, chills, hemoptysis, chest pain, pain, abd pain, n/v/d or wt loss/loss of appetite. Pt states chronic LE edema in which he takes Lasix for daily and adjusts his daily dose based on extent of the edema. Did not take Lasix this morning. Pt also confirms he take Eliquis BID for afib, last dose was this morning. Pt states most of his care and physicians through [facility]. Had outpt CT scan  chest on 5/29/25 showed new small left pleural effusion to left lower lobe loculated in appearance concern for lung abscess as well as enlarging 1.8 cm soft tissue nodule at anterior left upper pleural service suspicious for metastasis. Pt sent by Dr. Price to [facility] for Thoracic surgery admission and work up of CT scan findings. Pt states was given Keflex and Z pack by PCP yesterday but did not start taking.

## 2025-05-31 LAB
ANION GAP SERPL CALC-SCNC: 11 MMOL/L — SIGNIFICANT CHANGE UP (ref 7–14)
BUN SERPL-MCNC: 17 MG/DL — SIGNIFICANT CHANGE UP (ref 7–23)
CALCIUM SERPL-MCNC: 9.3 MG/DL — SIGNIFICANT CHANGE UP (ref 8.4–10.5)
CHLORIDE SERPL-SCNC: 105 MMOL/L — SIGNIFICANT CHANGE UP (ref 98–107)
CO2 SERPL-SCNC: 24 MMOL/L — SIGNIFICANT CHANGE UP (ref 22–31)
CREAT SERPL-MCNC: 1.21 MG/DL — SIGNIFICANT CHANGE UP (ref 0.5–1.3)
EGFR: 57 ML/MIN/1.73M2 — LOW
EGFR: 57 ML/MIN/1.73M2 — LOW
GLUCOSE SERPL-MCNC: 91 MG/DL — SIGNIFICANT CHANGE UP (ref 70–99)
HCT VFR BLD CALC: 36.7 % — LOW (ref 39–50)
HGB BLD-MCNC: 11.8 G/DL — LOW (ref 13–17)
INR BLD: 1.22 RATIO — HIGH (ref 0.85–1.16)
MCHC RBC-ENTMCNC: 32.2 G/DL — SIGNIFICANT CHANGE UP (ref 32–36)
MCHC RBC-ENTMCNC: 32.4 PG — SIGNIFICANT CHANGE UP (ref 27–34)
MCV RBC AUTO: 100.8 FL — HIGH (ref 80–100)
NRBC # BLD AUTO: 0 K/UL — SIGNIFICANT CHANGE UP (ref 0–0)
NRBC # FLD: 0 K/UL — SIGNIFICANT CHANGE UP (ref 0–0)
NRBC BLD AUTO-RTO: 0 /100 WBCS — SIGNIFICANT CHANGE UP (ref 0–0)
PLATELET # BLD AUTO: 283 K/UL — SIGNIFICANT CHANGE UP (ref 150–400)
POTASSIUM SERPL-MCNC: 4.4 MMOL/L — SIGNIFICANT CHANGE UP (ref 3.5–5.3)
POTASSIUM SERPL-SCNC: 4.4 MMOL/L — SIGNIFICANT CHANGE UP (ref 3.5–5.3)
PROTHROM AB SERPL-ACNC: 14.1 SEC — HIGH (ref 9.9–13.4)
RBC # BLD: 3.64 M/UL — LOW (ref 4.2–5.8)
RBC # FLD: 13.7 % — SIGNIFICANT CHANGE UP (ref 10.3–14.5)
SODIUM SERPL-SCNC: 140 MMOL/L — SIGNIFICANT CHANGE UP (ref 135–145)
WBC # BLD: 6.47 K/UL — SIGNIFICANT CHANGE UP (ref 3.8–10.5)
WBC # FLD AUTO: 6.47 K/UL — SIGNIFICANT CHANGE UP (ref 3.8–10.5)

## 2025-05-31 PROCEDURE — 71045 X-RAY EXAM CHEST 1 VIEW: CPT | Mod: 26

## 2025-05-31 PROCEDURE — 99233 SBSQ HOSP IP/OBS HIGH 50: CPT

## 2025-05-31 PROCEDURE — 99233 SBSQ HOSP IP/OBS HIGH 50: CPT | Mod: 57

## 2025-05-31 PROCEDURE — 99232 SBSQ HOSP IP/OBS MODERATE 35: CPT

## 2025-05-31 RX ORDER — ALPRAZOLAM 0.5 MG
0.12 TABLET, EXTENDED RELEASE 24 HR ORAL ONCE
Refills: 0 | Status: DISCONTINUED | OUTPATIENT
Start: 2025-05-31 | End: 2025-05-31

## 2025-05-31 RX ADMIN — ESCITALOPRAM OXALATE 10 MILLIGRAM(S): 20 TABLET ORAL at 12:53

## 2025-05-31 RX ADMIN — Medication 0.12 MILLIGRAM(S): at 00:47

## 2025-05-31 RX ADMIN — FUROSEMIDE 20 MILLIGRAM(S): 10 INJECTION INTRAMUSCULAR; INTRAVENOUS at 05:35

## 2025-05-31 RX ADMIN — DILTIAZEM HYDROCHLORIDE 180 MILLIGRAM(S): 240 TABLET, EXTENDED RELEASE ORAL at 05:35

## 2025-05-31 RX ADMIN — Medication 20 MILLIEQUIVALENT(S): at 12:54

## 2025-05-31 RX ADMIN — Medication 0.12 MILLIGRAM(S): at 21:54

## 2025-05-31 RX ADMIN — Medication 25 GRAM(S): at 04:27

## 2025-05-31 RX ADMIN — Medication 25 GRAM(S): at 21:53

## 2025-05-31 RX ADMIN — HEPARIN SODIUM 5000 UNIT(S): 1000 INJECTION INTRAVENOUS; SUBCUTANEOUS at 12:52

## 2025-05-31 RX ADMIN — HEPARIN SODIUM 5000 UNIT(S): 1000 INJECTION INTRAVENOUS; SUBCUTANEOUS at 05:36

## 2025-05-31 RX ADMIN — Medication 2 TABLET(S): at 21:54

## 2025-05-31 RX ADMIN — Medication 40 MILLIGRAM(S): at 05:35

## 2025-05-31 RX ADMIN — HEPARIN SODIUM 5000 UNIT(S): 1000 INJECTION INTRAVENOUS; SUBCUTANEOUS at 21:54

## 2025-05-31 RX ADMIN — ROSUVASTATIN CALCIUM 10 MILLIGRAM(S): 20 TABLET, FILM COATED ORAL at 05:45

## 2025-05-31 RX ADMIN — LOSARTAN POTASSIUM 25 MILLIGRAM(S): 100 TABLET, FILM COATED ORAL at 05:35

## 2025-05-31 RX ADMIN — Medication 25 GRAM(S): at 12:53

## 2025-05-31 NOTE — CONSULT NOTE ADULT - PROVIDER SPECIALTY LIST ADULT
Progress Note  Adonis Gold Patient Status:  Emergency    1979 MRN [id number]   Location [facility] EMERGENCY DEPARTMENT Attending Edwar Lazcano MD   Hosp Day # 0 PCP None Pcp     Paged by Dr. Lazcano, ED Physician    HPI:  44 year old male with PMH of dilated cardiomyopathy and HTN who presented to the ED with c/o dyspnea. Pt reported running out of torsemide. Pt currently on toresmide 40 mg BID. Work up in ED showed fluid overload. IV Lasix was given.       Labs:  Troponin negative  BNP 1,311      Diagnostics:   EKG with no acute ischemic changes  CXR with no acute findings      Assessment/Plan:    - Disposition as per ED Physician  - Discharged home for outpatient cardiology follow up      ERNIE Craig  [facility]  2024  10:48 PM  
Pulmonology
Intervent Radiology

## 2025-05-31 NOTE — PROGRESS NOTE ADULT - SUBJECTIVE AND OBJECTIVE BOX
Subjective: patient seen and examined with thoracic surgery team  pt without new complaints  overall not feeling well, persistent cough  pain controlled  using incentive spirometer  on bowel regimen, +flatus, +BM  ambulatory with assistance  d/w attending on morning TEAMS report      Vital Signs:  Vital Signs Last 24 Hrs  T(C): 36.5 (05-31-25 @ 04:39), Max: 37 (05-31-25 @ 00:38)  T(F): 97.7 (05-31-25 @ 04:39), Max: 98.6 (05-31-25 @ 00:38)  HR: 69 (05-31-25 @ 04:39) (64 - 94)  BP: 140/85 (05-31-25 @ 04:39) (129/77 - 146/80)  RR: 18 (05-31-25 @ 04:39) (17 - 18)  SpO2: 96% (05-31-25 @ 04:39) (95% - 98%) on (O2)    PE  Telemetry/Alarms: Afib  General: awake and alert NAD  Neurology: A&Ox3, nonfocal, SANTIZO x 4  Respiratory: CTA B/L  CV: RRR, S1S2, no murmurs, rubs or gallops  Abdominal: Soft, NT, ND +BS, Last BM  Extremities: No edema, + peripheral pulses    Relevant labs, radiology and Medications reviewed                        11.8   6.47  )-----------( 283      ( 31 May 2025 06:02 )             36.7     05-31    140  |  105  |  17  ----------------------------<  91  4.4   |  24  |  1.21    Ca    9.3      31 May 2025 06:02    TPro  6.8  /  Alb  3.5  /  TBili  0.8  /  DBili  x   /  AST  21  /  ALT  13  /  AlkPhos  83  05-30    PT/INR - ( 31 May 2025 06:02 )   PT: 14.1 sec;   INR: 1.22 ratio         PTT - ( 30 May 2025 12:40 )  PTT:33.8 sec  MEDICATIONS  (STANDING):  diltiazem    milliGRAM(s) Oral daily  escitalopram 10 milliGRAM(s) Oral daily  furosemide    Tablet 20 milliGRAM(s) Oral two times a day  heparin   Injectable 5000 Unit(s) SubCutaneous every 8 hours  losartan 25 milliGRAM(s) Oral daily  pantoprazole    Tablet 40 milliGRAM(s) Oral before breakfast  piperacillin/tazobactam IVPB.. 3.375 Gram(s) IV Intermittent every 8 hours  polyethylene glycol 3350 17 Gram(s) Oral daily  potassium chloride    Tablet ER 20 milliEquivalent(s) Oral daily  rosuvastatin 10 milliGRAM(s) Oral daily  senna 2 Tablet(s) Oral at bedtime  tamsulosin 0.8 milliGRAM(s) Oral at bedtime    MEDICATIONS  (PRN):  bisacodyl Suppository 10 milliGRAM(s) Rectal daily PRN Constipation    Pertinent Physical Exam  I&O's Summary    30 May 2025 07:01  -  31 May 2025 07:00  --------------------------------------------------------  IN: 680 mL / OUT: 1100 mL / NET: -420 mL      SOCIAL HISTORY  · Substance use	No  · Social History (marital status, living situation, occupation, and sexual history)	, lives with wife  Quit smoking at 23yrs old  Last ETOH over 1yr ago      PAST MEDICAL & SURGICAL HISTORY:  Atrial Fibrillation      Hypertension      Hyperlipemia      Herniated Disc      History of Ventricular Tachycardia      Depression      Bilateral Recurrent Inguinal Hernia  s/p surgical repair      Rotator Cuff Tear  left , s/p  surgical repair      History of lump in breast  right      MVP (mitral valve prolapse)      Bladder cancer      Bilat Ing Hernia  s/p surgical repair      RCT (Rotator Cuff Tear)  left -, s/p surgical repair      Pacemaker Insertion  8/26/09 Medtronic, model #[id number]      Lumbar Disc Surgery  Spinal stenosis  L5 S1 1996      S/P LASIK Surgery  2002 bilateral eyes      S/P Carpal Tunnel Release  left  2008, right 2010      History of cardiac radiofrequency ablation  9/2019      S/P coronary artery stent placement  2016- [facility]      S/P mitral valve clip implantation      History of total hip replacement, right      ASSESSMENT  89yo M with h/o Afib, heart failure, MVR, s/p Mitral clip, PPM, HLD, HLD, depression, [place], Bladder CA (currently undergoing weekly intra bladder chemo) has been seeing his outpt PCP and Pulmonologist( Dr. Price) for 1 month of cough, general malaise, low grade temp now found to have new small left pleural effusion to left lower lobe loculated in appearance concern for lung abscess as well as enlarging 1.8 cm soft tissue nodule at anterior left upper pleural service suspicious for metastasis.    PLAN  Neuro: Pain management  Pulm: Encourage coughing, deep breathing and use of incentive spirometry.   pulmonary consult, Dr Amor  Cardio: Monitor telemetry/alarms; TTE ordered  GI: Tolerating diet. Continue stool softeners.  Renal: monitor urine output, supplement electrolytes as needed  Vasc: Heparin SC/SCDs for DVT prophylaxis  Heme: Stable H/H. .   ID: zosyn, f/u sputum cs  Therapy: OOB/ambulate  Tubes: Monitor Chest tube output  Disposition: Plan for IR placement of PTC on Monday  Discussed with Cardiothoracic Team at AM rounds.      Subjective: patient seen and examined with thoracic surgery team  pt without new complaints  overall not feeling well, persistent cough  pain controlled  using incentive spirometer  on bowel regimen, +flatus, +BM  ambulatory with assistance  d/w attending on morning TEAMS report      Vital Signs:  Vital Signs Last 24 Hrs  T(C): 36.5 (05-31-25 @ 04:39), Max: 37 (05-31-25 @ 00:38)  T(F): 97.7 (05-31-25 @ 04:39), Max: 98.6 (05-31-25 @ 00:38)  HR: 69 (05-31-25 @ 04:39) (64 - 94)  BP: 140/85 (05-31-25 @ 04:39) (129/77 - 146/80)  RR: 18 (05-31-25 @ 04:39) (17 - 18)  SpO2: 96% (05-31-25 @ 04:39) (95% - 98%) on (O2)    PE  Telemetry/Alarms: Afib  General: awake and alert NAD  Neurology: A&Ox3, nonfocal, SANTIZO x 4  Respiratory: CTA B/L  CV: RRR, S1S2, no murmurs, rubs or gallops  Abdominal: Soft, NT, ND +BS, Last BM  Extremities: No edema, + peripheral pulses    Relevant labs, radiology and Medications reviewed                        11.8   6.47  )-----------( 283      ( 31 May 2025 06:02 )             36.7     05-31    140  |  105  |  17  ----------------------------<  91  4.4   |  24  |  1.21    Ca    9.3      31 May 2025 06:02    TPro  6.8  /  Alb  3.5  /  TBili  0.8  /  DBili  x   /  AST  21  /  ALT  13  /  AlkPhos  83  05-30    PT/INR - ( 31 May 2025 06:02 )   PT: 14.1 sec;   INR: 1.22 ratio         PTT - ( 30 May 2025 12:40 )  PTT:33.8 sec  MEDICATIONS  (STANDING):  diltiazem    milliGRAM(s) Oral daily  escitalopram 10 milliGRAM(s) Oral daily  furosemide    Tablet 20 milliGRAM(s) Oral two times a day  heparin   Injectable 5000 Unit(s) SubCutaneous every 8 hours  losartan 25 milliGRAM(s) Oral daily  pantoprazole    Tablet 40 milliGRAM(s) Oral before breakfast  piperacillin/tazobactam IVPB.. 3.375 Gram(s) IV Intermittent every 8 hours  polyethylene glycol 3350 17 Gram(s) Oral daily  potassium chloride    Tablet ER 20 milliEquivalent(s) Oral daily  rosuvastatin 10 milliGRAM(s) Oral daily  senna 2 Tablet(s) Oral at bedtime  tamsulosin 0.8 milliGRAM(s) Oral at bedtime    MEDICATIONS  (PRN):  bisacodyl Suppository 10 milliGRAM(s) Rectal daily PRN Constipation    Pertinent Physical Exam  I&O's Summary    30 May 2025 07:01  -  31 May 2025 07:00  --------------------------------------------------------  IN: 680 mL / OUT: 1100 mL / NET: -420 mL      SOCIAL HISTORY  · Substance use	No  · Social History (marital status, living situation, occupation, and sexual history)	, lives with wife  Quit smoking at 23yrs old  Last ETOH over 1yr ago      PAST MEDICAL & SURGICAL HISTORY:  Atrial Fibrillation      Hypertension      Hyperlipemia      Herniated Disc      History of Ventricular Tachycardia      Depression      Bilateral Recurrent Inguinal Hernia  s/p surgical repair      Rotator Cuff Tear  left , s/p  surgical repair      History of lump in breast  right      MVP (mitral valve prolapse)      Bladder cancer      Bilat Ing Hernia  s/p surgical repair      RCT (Rotator Cuff Tear)  left -, s/p surgical repair      Pacemaker Insertion  8/26/09 [employer]tr[employer], model #[id number]      Lumbar Disc Surgery  Spinal stenosis  L5 S1 1996      S/P LASIK Surgery  2002 bilateral eyes      S/P Carpal Tunnel Release  left  2008, right 2010      History of cardiac radiofrequency ablation  9/2019      S/P coronary artery stent placement  2016- [facility]      S/P mitral valve clip implantation      History of total hip replacement, right      ASSESSMENT  89yo M with h/o Afib, heart failure, MVR, s/p Mitral clip, PPM, HLD, HLD, depression, [place], Bladder CA (currently undergoing weekly intra bladder chemo) has been seeing his outpt PCP and Pulmonologist( Dr. Price) for 1 month of cough, general malaise, low grade temp now found to have new small left pleural effusion to left lower lobe loculated in appearance concern for lung abscess as well as enlarging 1.8 cm soft tissue nodule at anterior left upper pleural service suspicious for metastasis.    PLAN  Neuro: Pain management  Pulm: Encourage coughing, deep breathing and use of incentive spirometry.   pulmonary consult, Dr Price/ Margareth / Eva  Cardio: Monitor telemetry/alarms; TTE ordered  GI: Tolerating diet. Continue stool softeners.  Renal: monitor urine output, supplement electrolytes as needed  Vasc: Heparin SC/SCDs for DVT prophylaxis  Heme: Stable H/H. .   ID: zosyn, f/u sputum cs  Therapy: OOB/ambulate  Tubes: Monitor Chest tube output  Disposition: Plan for IR placement of PTC on Monday  Discussed with Cardiothoracic Team at AM rounds.

## 2025-05-31 NOTE — PROGRESS NOTE ADULT - SUBJECTIVE AND OBJECTIVE BOX
PULMONARY PROGRESS NOTE    ESTHER GOMEZ  [id number]    Patient is a 89y old  Male who presents with a chief complaint of Cough, fatigue, pleural nodule (31 May 2025 10:34)      HPI:  -  -    ROS:   -    ACTIVE MEDICATION LIST:  MEDICATIONS  (STANDING):  diltiazem    milliGRAM(s) Oral daily  escitalopram 10 milliGRAM(s) Oral daily  furosemide    Tablet 20 milliGRAM(s) Oral two times a day  heparin   Injectable 5000 Unit(s) SubCutaneous every 8 hours  losartan 25 milliGRAM(s) Oral daily  pantoprazole    Tablet 40 milliGRAM(s) Oral before breakfast  piperacillin/tazobactam IVPB.. 3.375 Gram(s) IV Intermittent every 8 hours  polyethylene glycol 3350 17 Gram(s) Oral daily  potassium chloride    Tablet ER 20 milliEquivalent(s) Oral daily  rosuvastatin 10 milliGRAM(s) Oral daily  senna 2 Tablet(s) Oral at bedtime  tamsulosin 0.8 milliGRAM(s) Oral at bedtime    MEDICATIONS  (PRN):  bisacodyl Suppository 10 milliGRAM(s) Rectal daily PRN Constipation      EXAM:  Vital Signs Last 24 Hrs  T(C): 36.8 (31 May 2025 12:32), Max: 37 (31 May 2025 00:38)  T(F): 98.2 (31 May 2025 12:32), Max: 98.6 (31 May 2025 00:38)  HR: 74 (31 May 2025 12:32) (64 - 84)  BP: 112/63 (31 May 2025 12:32) (112/63 - 140/85)  BP(mean): --  RR: 18 (31 May 2025 12:32) (16 - 18)  SpO2: 97% (31 May 2025 12:32) (96% - 98%)    Parameters below as of 31 May 2025 12:32  Patient On (Oxygen Delivery Method): room air        GENERAL: The patient is awake and alert in no apparent distress.     LUNGS: Clear to auscultation without wheezing, rales or rhonchi; respirations unlabored    HEART: Regular rate and rhythm without murmur.                            11.8   6.47  )-----------( 283      ( 31 May 2025 06:02 )             36.7       05-31    140  |  105  |  17  ----------------------------<  91  4.4   |  24  |  1.21    Ca    9.3      31 May 2025 06:02    TPro  6.8  /  Alb  3.5  /  TBili  0.8  /  DBili  x   /  AST  21  /  ALT  13  /  AlkPhos  83  05-30    < from: Xray Chest 1 View- PORTABLE-Routine (Xray Chest 1 View- PORTABLE-Routine in AM.) (05.31.25 @ 10:04) >    ACC: [id number] EXAM:  XR CHEST PORTABLE ROUTINE 1V   ORDERED BY: MINDY ODELL     PROCEDURE DATE:  05/31/2025          INTERPRETATION:  CLINICAL INFORMATION: Dyspnea    TIME OF EXAMINATION: 8/31/2025 at 8:14 AM    EXAM: Portable chest    FINDINGS:    Left-sided dual-lead pacemaker with leads intact with mitral valve   prosthesis.  Elevated left hemidiaphragm displacing the mediastinum to the right.  Well-defined opacity at the left lung base representing loculated fluid   as per recent chest CT.  Remainder of the left lung and the right lung are clear.  No congestion to indicate CHF.  No pneumothorax.        COMPARISON: CT chest May 30, 2025        IMPRESSION: Elevated left hemidiaphragm with clear lungs and loculated   left effusion.    --- End of Report ---            ADALID FARRELL MD; Attending Radiologist  This document has been electronically signed. May 31 2025 10:56AM    < end of copied text >  < from: CT Angio Chest PE Protocol w/ IV Cont (05.30.25 @ 15:06) >    ACC: [id number] EXAM:  CT ANGIO CHEST PULM ART [facility]   ORDERED BY: FENG MANE     PROCEDURE DATE:  05/30/2025          INTERPRETATION:  INDICATION: Evaluate for pulmonary embolism, known   left-sided pulmonary nodule.    CT angiogram of the chest was performed following intravenous   administration of 71 cc of Omnipaque-350. 29 cc of contrast was   discarded. MIP images are submitted.    COMPARISON: No prior chest CTs.    Limited evaluation of some of the subsegmental pulmonary arterial   branches due to respiratory motion artifact. No pulmonary arterial emboli   within the other well visualized pulmonary arteries.    Left upper anterior chest wall cardiac device with the leads terminating   within the right atrium and right ventricle.    LYMPH NODES/MEDIASTINUM: Enlarged anterior mediastinal nodule likely a   lymph node measuring about 1.8 cm. 2 adjacent left internal mammary chain   lymph nodes largest measuring about 1.5 cm.    HEART: No pericardial effusion. Vascular calcifications with involvement   of the aorta and the coronary arteries. Cardiomegaly with right heart   chamber enlargement. Mitral annular repair. Mitral valve clip. Main   pulmonary artery is enlarged measuring about 4.2 cm suggestive of   pulmonary arterial hypertension.    UPPER ABDOMEN: Elevation of the left hemidiaphragm. Partially imaged left   renal cyst. Prominence of the partially imaged left renal collecting   system not well evaluated.    LUNGS/PLEURA: 8 x 10 cm intrathoracic fluid collection within the left   mid to lower posterior hemithorax likely within the pleural space.   Adjacent small left pleural effusion with areas of loculation.    Mild left lower lobe partial compressive atelectasis adjacent to the   left-sided loculated fluid collection and adjacent to the elevated left   hemidiaphragm.    Vertically oriented the left lower lobe paramediastinal patchy opacity   with some associated volume loss centered within the superior segment   suggestive of atelectasis possibly with superimposed infection. Minimal   subsegmental areas of atelectasis within the medial segment of the right   middle lobe and lingula adjacent to the anterior mediastinum.    Cluster of a few ill-defined right upper lobe nodular opacities towards   the apex.    CHEST WALL: Spinal Degenerative Changes. Left shoulder surgery.    IMPRESSION:    The PE study is limited for evaluation of some of the subsegmental   pulmonary arterial branches due to respiratory motion artifact. No   pulmonary arterial emboli within the other well visualized pulmonary   arteries.    8 x 10 cm left mid to lower posterior hemithorax loculated fluid   collection likely within the pleural space. Adjacent small left pleural   effusion with areas of loculation.    Few bilateral nodular and patchy opacities as described above may be of   infectious etiology superimposed on atelectasis.    Mediastinal and left internal mammary chain lymphadenopathy.    Mild prominence of the partially imaged left renal collecting system is   not well evaluated. Renal ultrasound is recommended for further   evaluation.    Comparison with prior chest CTs is recommended for further evaluation of   the above findings and if images are made available, an addendum can be   issued.    --- End of Report ---            NICOLE LANDERS MD; Attending Radiologist  This document has been electronically signed. May 30 2025  3:29PM    < end of copied text >    PROBLEM LIST:  89y Male with HEALTH ISSUES - PROBLEM Dx:            RECS:        Please call with any questions.    Claudette Acevedo DO  [facility] Pulmonary/Sleep Medicine  [phone]   PULMONARY PROGRESS NOTE    ESTHER GOMEZ  [id number]    Patient is a 89y old  Male who presents with a chief complaint of Cough, fatigue, pleural nodule (31 May 2025 10:34)      HPI:  -sent to the ER from office // saw Dr Price  [facility]: 89-year-old patient,  chf, cad sp pci, afib on eliquis, pulm HTN, mitral clip, recent bladder ca, hiatal hernia  hemoptysis history s/p abx by ent a month ago  worsening cough, abnormal ct chest  admitted to CTS For loculated effusion  now pending IR guided drainage -left      ROS:   -    ACTIVE MEDICATION LIST:  MEDICATIONS  (STANDING):  diltiazem    milliGRAM(s) Oral daily  escitalopram 10 milliGRAM(s) Oral daily  furosemide    Tablet 20 milliGRAM(s) Oral two times a day  heparin   Injectable 5000 Unit(s) SubCutaneous every 8 hours  losartan 25 milliGRAM(s) Oral daily  pantoprazole    Tablet 40 milliGRAM(s) Oral before breakfast  piperacillin/tazobactam IVPB.. 3.375 Gram(s) IV Intermittent every 8 hours  polyethylene glycol 3350 17 Gram(s) Oral daily  potassium chloride    Tablet ER 20 milliEquivalent(s) Oral daily  rosuvastatin 10 milliGRAM(s) Oral daily  senna 2 Tablet(s) Oral at bedtime  tamsulosin 0.8 milliGRAM(s) Oral at bedtime    MEDICATIONS  (PRN):  bisacodyl Suppository 10 milliGRAM(s) Rectal daily PRN Constipation      EXAM:  Vital Signs Last 24 Hrs  T(C): 36.8 (31 May 2025 12:32), Max: 37 (31 May 2025 00:38)  T(F): 98.2 (31 May 2025 12:32), Max: 98.6 (31 May 2025 00:38)  HR: 74 (31 May 2025 12:32) (64 - 84)  BP: 112/63 (31 May 2025 12:32) (112/63 - 140/85)  BP(mean): --  RR: 18 (31 May 2025 12:32) (16 - 18)  SpO2: 97% (31 May 2025 12:32) (96% - 98%)    Parameters below as of 31 May 2025 12:32  Patient On (Oxygen Delivery Method): room air        GENERAL: The patient is awake and alert in no apparent distress.     LUNGS: Clear to auscultation without wheezing, rales or rhonchi; respirations unlabored    HEART: Regular rate and rhythm without murmur.                            11.8   6.47  )-----------( 283      ( 31 May 2025 06:02 )             36.7       05-31    140  |  105  |  17  ----------------------------<  91  4.4   |  24  |  1.21    Ca    9.3      31 May 2025 06:02    TPro  6.8  /  Alb  3.5  /  TBili  0.8  /  DBili  x   /  AST  21  /  ALT  13  /  AlkPhos  83  05-30    < from: Xray Chest 1 View- PORTABLE-Routine (Xray Chest 1 View- PORTABLE-Routine in AM.) (05.31.25 @ 10:04) >    ACC: [id number] EXAM:  XR CHEST PORTABLE ROUTINE 1V   ORDERED BY: MINDY ODELL     PROCEDURE DATE:  05/31/2025          INTERPRETATION:  CLINICAL INFORMATION: Dyspnea    TIME OF EXAMINATION: 8/31/2025 at 8:14 AM    EXAM: Portable chest    FINDINGS:    Left-sided dual-lead pacemaker with leads intact with mitral valve   prosthesis.  Elevated left hemidiaphragm displacing the mediastinum to the right.  Well-defined opacity at the left lung base representing loculated fluid   as per recent chest CT.  Remainder of the left lung and the right lung are clear.  No congestion to indicate CHF.  No pneumothorax.        COMPARISON: CT chest May 30, 2025        IMPRESSION: Elevated left hemidiaphragm with clear lungs and loculated   left effusion.    --- End of Report ---            ADALID FARRELL MD; Attending Radiologist  This document has been electronically signed. May 31 2025 10:56AM    < end of copied text >  < from: CT Angio Chest PE Protocol w/ IV Cont (05.30.25 @ 15:06) >    ACC: [id number] EXAM:  CT ANGIO CHEST PULM ART [facility]   ORDERED BY: FENG MANE     PROCEDURE DATE:  05/30/2025          INTERPRETATION:  INDICATION: Evaluate for pulmonary embolism, known   left-sided pulmonary nodule.    CT angiogram of the chest was performed following intravenous   administration of 71 cc of Omnipaque-350. 29 cc of contrast was   discarded. MIP images are submitted.    COMPARISON: No prior chest CTs.    Limited evaluation of some of the subsegmental pulmonary arterial   branches due to respiratory motion artifact. No pulmonary arterial emboli   within the other well visualized pulmonary arteries.    Left upper anterior chest wall cardiac device with the leads terminating   within the right atrium and right ventricle.    LYMPH NODES/MEDIASTINUM: Enlarged anterior mediastinal nodule likely a   lymph node measuring about 1.8 cm. 2 adjacent left internal mammary chain   lymph nodes largest measuring about 1.5 cm.    HEART: No pericardial effusion. Vascular calcifications with involvement   of the aorta and the coronary arteries. Cardiomegaly with right heart   chamber enlargement. Mitral annular repair. Mitral valve clip. Main   pulmonary artery is enlarged measuring about 4.2 cm suggestive of   pulmonary arterial hypertension.    UPPER ABDOMEN: Elevation of the left hemidiaphragm. Partially imaged left   renal cyst. Prominence of the partially imaged left renal collecting   system not well evaluated.    LUNGS/PLEURA: 8 x 10 cm intrathoracic fluid collection within the left   mid to lower posterior hemithorax likely within the pleural space.   Adjacent small left pleural effusion with areas of loculation.    Mild left lower lobe partial compressive atelectasis adjacent to the   left-sided loculated fluid collection and adjacent to the elevated left   hemidiaphragm.    Vertically oriented the left lower lobe paramediastinal patchy opacity   with some associated volume loss centered within the superior segment   suggestive of atelectasis possibly with superimposed infection. Minimal   subsegmental areas of atelectasis within the medial segment of the right   middle lobe and lingula adjacent to the anterior mediastinum.    Cluster of a few ill-defined right upper lobe nodular opacities towards   the apex.    CHEST WALL: Spinal Degenerative Changes. Left shoulder surgery.    IMPRESSION:    The PE study is limited for evaluation of some of the subsegmental   pulmonary arterial branches due to respiratory motion artifact. No   pulmonary arterial emboli within the other well visualized pulmonary   arteries.    8 x 10 cm left mid to lower posterior hemithorax loculated fluid   collection likely within the pleural space. Adjacent small left pleural   effusion with areas of loculation.    Few bilateral nodular and patchy opacities as described above may be of   infectious etiology superimposed on atelectasis.    Mediastinal and left internal mammary chain lymphadenopathy.    Mild prominence of the partially imaged left renal collecting system is   not well evaluated. Renal ultrasound is recommended for further   evaluation.    Comparison with prior chest CTs is recommended for further evaluation of   the above findings and if images are made available, an addendum can be   issued.    --- End of Report ---            NICOLE LANDERS MD; Attending Radiologist  This document has been electronically signed. May 30 2025  3:29PM    < end of copied text >    PROBLEM LIST:  89y Male with HEALTH ISSUES - PROBLEM Dx:            RECS:        Please call with any questions.    Claudette Acevedo DO  [facility] Pulmonary/Sleep Medicine  [phone]   PULMONARY PROGRESS NOTE    ESTHER GOMEZ  MRN-[id number]    Patient is a 89y old  Male who presents with a chief complaint of Cough, fatigue, pleural nodule (31 May 2025 10:34)      HPI:  -sent to the ER from office // saw Dr Price  [facility]: 89-year-old patient,  chf, cad sp pci, afib on eliquis, pulm HTN, mitral clip, recent bladder ca, hiatal hernia  hemoptysis history s/p abx by ent a month ago- finished zpak  worsening cough, abnormal ct chest  admitted to CTS For loculated effusion  now pending IR guided drainage -left      today- sitting on room air  less cough  on zosyn      ROS:   -    ACTIVE MEDICATION LIST:  MEDICATIONS  (STANDING):  diltiazem    milliGRAM(s) Oral daily  escitalopram 10 milliGRAM(s) Oral daily  furosemide    Tablet 20 milliGRAM(s) Oral two times a day  heparin   Injectable 5000 Unit(s) SubCutaneous every 8 hours  losartan 25 milliGRAM(s) Oral daily  pantoprazole    Tablet 40 milliGRAM(s) Oral before breakfast  piperacillin/tazobactam IVPB.. 3.375 Gram(s) IV Intermittent every 8 hours  polyethylene glycol 3350 17 Gram(s) Oral daily  potassium chloride    Tablet ER 20 milliEquivalent(s) Oral daily  rosuvastatin 10 milliGRAM(s) Oral daily  senna 2 Tablet(s) Oral at bedtime  tamsulosin 0.8 milliGRAM(s) Oral at bedtime    MEDICATIONS  (PRN):  bisacodyl Suppository 10 milliGRAM(s) Rectal daily PRN Constipation      EXAM:  Vital Signs Last 24 Hrs  T(C): 36.8 (31 May 2025 12:32), Max: 37 (31 May 2025 00:38)  T(F): 98.2 (31 May 2025 12:32), Max: 98.6 (31 May 2025 00:38)  HR: 74 (31 May 2025 12:32) (64 - 84)  BP: 112/63 (31 May 2025 12:32) (112/63 - 140/85)  BP(mean): --  RR: 18 (31 May 2025 12:32) (16 - 18)  SpO2: 97% (31 May 2025 12:32) (96% - 98%)    Parameters below as of 31 May 2025 12:32  Patient On (Oxygen Delivery Method): room air        GENERAL: The patient is awake and alert in no apparent distress.     LUNGS:  diminished left base                              11.8   6.47  )-----------( 283      ( 31 May 2025 06:02 )             36.7       05-31    140  |  105  |  17  ----------------------------<  91  4.4   |  24  |  1.21    Ca    9.3      31 May 2025 06:02    TPro  6.8  /  Alb  3.5  /  TBili  0.8  /  DBili  x   /  AST  21  /  ALT  13  /  AlkPhos  83  05-30    < from: Xray Chest 1 View- PORTABLE-Routine (Xray Chest 1 View- PORTABLE-Routine in AM.) (05.31.25 @ 10:04) >    ACC: [id number] EXAM:  XR CHEST PORTABLE ROUTINE 1V   ORDERED BY: MINDY ODELL     PROCEDURE DATE:  05/31/2025          INTERPRETATION:  CLINICAL INFORMATION: Dyspnea    TIME OF EXAMINATION: 8/31/2025 at 8:14 AM    EXAM: Portable chest    FINDINGS:    Left-sided dual-lead pacemaker with leads intact with mitral valve   prosthesis.  Elevated left hemidiaphragm displacing the mediastinum to the right.  Well-defined opacity at the left lung base representing loculated fluid   as per recent chest CT.  Remainder of the left lung and the right lung are clear.  No congestion to indicate CHF.  No pneumothorax.        COMPARISON: CT chest May 30, 2025        IMPRESSION: Elevated left hemidiaphragm with clear lungs and loculated   left effusion.    --- End of Report ---            ADALID FARRELL MD; Attending Radiologist  This document has been electronically signed. May 31 2025 10:56AM    < end of copied text >  < from: CT Angio Chest PE Protocol w/ IV Cont (05.30.25 @ 15:06) >    ACC: [id number] EXAM:  CT ANGIO CHEST PULM ART [facility]   ORDERED BY: FENG MANE     PROCEDURE DATE:  05/30/2025          INTERPRETATION:  INDICATION: Evaluate for pulmonary embolism, known   left-sided pulmonary nodule.    CT angiogram of the chest was performed following intravenous   administration of 71 cc of Omnipaque-350. 29 cc of contrast was   discarded. MIP images are submitted.    COMPARISON: No prior chest CTs.    Limited evaluation of some of the subsegmental pulmonary arterial   branches due to respiratory motion artifact. No pulmonary arterial emboli   within the other well visualized pulmonary arteries.    Left upper anterior chest wall cardiac device with the leads terminating   within the right atrium and right ventricle.    LYMPH NODES/MEDIASTINUM: Enlarged anterior mediastinal nodule likely a   lymph node measuring about 1.8 cm. 2 adjacent left internal mammary chain   lymph nodes largest measuring about 1.5 cm.    HEART: No pericardial effusion. Vascular calcifications with involvement   of the aorta and the coronary arteries. Cardiomegaly with right heart   chamber enlargement. Mitral annular repair. Mitral valve clip. Main   pulmonary artery is enlarged measuring about 4.2 cm suggestive of   pulmonary arterial hypertension.    UPPER ABDOMEN: Elevation of the left hemidiaphragm. Partially imaged left   renal cyst. Prominence of the partially imaged left renal collecting   system not well evaluated.    LUNGS/PLEURA: 8 x 10 cm intrathoracic fluid collection within the left   mid to lower posterior hemithorax likely within the pleural space.   Adjacent small left pleural effusion with areas of loculation.    Mild left lower lobe partial compressive atelectasis adjacent to the   left-sided loculated fluid collection and adjacent to the elevated left   hemidiaphragm.    Vertically oriented the left lower lobe paramediastinal patchy opacity   with some associated volume loss centered within the superior segment   suggestive of atelectasis possibly with superimposed infection. Minimal   subsegmental areas of atelectasis within the medial segment of the right   middle lobe and lingula adjacent to the anterior mediastinum.    Cluster of a few ill-defined right upper lobe nodular opacities towards   the apex.    CHEST WALL: Spinal Degenerative Changes. Left shoulder surgery.    IMPRESSION:    The PE study is limited for evaluation of some of the subsegmental   pulmonary arterial branches due to respiratory motion artifact. No   pulmonary arterial emboli within the other well visualized pulmonary   arteries.    8 x 10 cm left mid to lower posterior hemithorax loculated fluid   collection likely within the pleural space. Adjacent small left pleural   effusion with areas of loculation.    Few bilateral nodular and patchy opacities as described above may be of   infectious etiology superimposed on atelectasis.    Mediastinal and left internal mammary chain lymphadenopathy.    Mild prominence of the partially imaged left renal collecting system is   not well evaluated. Renal ultrasound is recommended for further   evaluation.    Comparison with prior chest CTs is recommended for further evaluation of   the above findings and if images are made available, an addendum can be   issued.    --- End of Report ---            NICOLE LANDERS MD; Attending Radiologist  This document has been electronically signed. May 30 2025  3:29PM    < end of copied text >    PROBLEM LIST:  89y Male with HEALTH ISSUES - PROBLEM Dx:            RECS:  broad spectrum abx  appreciate CTS/ IR teams  would send out fluid for path, gram stain, cell count, ldh, protein  not on bronchodilators outpatient  will need repeat scan eventually post procedure  use incentive spirometer  albuterol HFA PRN  f/u with Dr Price          Please call with any questions.    Claudette Acevedo,   [facility] Pulmonary/Sleep Medicine  [phone]   PULMONARY PROGRESS NOTE    ESTHER GOMEZ  MRN-[id number]    Patient is a 89y old  Male who presents with a chief complaint of Cough, fatigue, pleural nodule (31 May 2025 10:34)      HPI:  -sent to the ER from office // saw Dr Price  [facility]: 89-year-old patient,  chf, cad sp pci, afib on eliquis, pulm HTN, mitral clip, recent bladder ca currently undergoing weekly intra bladder chemo), hiatal hernia  hemoptysis history s/p abx by ent a month ago- finished zpak  worsening cough, abnormal ct chest  admitted to CTS For loculated effusion  now pending IR guided drainage -left      today- sitting on room air  less cough  on zosyn      ROS:   -    ACTIVE MEDICATION LIST:  MEDICATIONS  (STANDING):  diltiazem    milliGRAM(s) Oral daily  escitalopram 10 milliGRAM(s) Oral daily  furosemide    Tablet 20 milliGRAM(s) Oral two times a day  heparin   Injectable 5000 Unit(s) SubCutaneous every 8 hours  losartan 25 milliGRAM(s) Oral daily  pantoprazole    Tablet 40 milliGRAM(s) Oral before breakfast  piperacillin/tazobactam IVPB.. 3.375 Gram(s) IV Intermittent every 8 hours  polyethylene glycol 3350 17 Gram(s) Oral daily  potassium chloride    Tablet ER 20 milliEquivalent(s) Oral daily  rosuvastatin 10 milliGRAM(s) Oral daily  senna 2 Tablet(s) Oral at bedtime  tamsulosin 0.8 milliGRAM(s) Oral at bedtime    MEDICATIONS  (PRN):  bisacodyl Suppository 10 milliGRAM(s) Rectal daily PRN Constipation      EXAM:  Vital Signs Last 24 Hrs  T(C): 36.8 (31 May 2025 12:32), Max: 37 (31 May 2025 00:38)  T(F): 98.2 (31 May 2025 12:32), Max: 98.6 (31 May 2025 00:38)  HR: 74 (31 May 2025 12:32) (64 - 84)  BP: 112/63 (31 May 2025 12:32) (112/63 - 140/85)  BP(mean): --  RR: 18 (31 May 2025 12:32) (16 - 18)  SpO2: 97% (31 May 2025 12:32) (96% - 98%)    Parameters below as of 31 May 2025 12:32  Patient On (Oxygen Delivery Method): room air        GENERAL: The patient is awake and alert in no apparent distress.     LUNGS:  diminished left base                              11.8   6.47  )-----------( 283      ( 31 May 2025 06:02 )             36.7       05-31    140  |  105  |  17  ----------------------------<  91  4.4   |  24  |  1.21    Ca    9.3      31 May 2025 06:02    TPro  6.8  /  Alb  3.5  /  TBili  0.8  /  DBili  x   /  AST  21  /  ALT  13  /  AlkPhos  83  05-30    < from: Xray Chest 1 View- PORTABLE-Routine (Xray Chest 1 View- PORTABLE-Routine in AM.) (05.31.25 @ 10:04) >    ACC: [id number] EXAM:  XR CHEST PORTABLE ROUTINE 1V   ORDERED BY: MINDY ODELL     PROCEDURE DATE:  05/31/2025          INTERPRETATION:  CLINICAL INFORMATION: Dyspnea    TIME OF EXAMINATION: 8/31/2025 at 8:14 AM    EXAM: Portable chest    FINDINGS:    Left-sided dual-lead pacemaker with leads intact with mitral valve   prosthesis.  Elevated left hemidiaphragm displacing the mediastinum to the right.  Well-defined opacity at the left lung base representing loculated fluid   as per recent chest CT.  Remainder of the left lung and the right lung are clear.  No congestion to indicate CHF.  No pneumothorax.        COMPARISON: CT chest May 30, 2025        IMPRESSION: Elevated left hemidiaphragm with clear lungs and loculated   left effusion.    --- End of Report ---            ADALID FARRELL MD; Attending Radiologist  This document has been electronically signed. May 31 2025 10:56AM    < end of copied text >  < from: CT Angio Chest PE Protocol w/ IV Cont (05.30.25 @ 15:06) >    ACC: [id number] EXAM:  CT ANGIO CHEST PULM ART WAWIC   ORDERED BY: FENG MANE     PROCEDURE DATE:  05/30/2025          INTERPRETATION:  INDICATION: Evaluate for pulmonary embolism, known   left-sided pulmonary nodule.    CT angiogram of the chest was performed following intravenous   administration of 71 cc of Omnipaque-350. 29 cc of contrast was   discarded. MIP images are submitted.    COMPARISON: No prior chest CTs.    Limited evaluation of some of the subsegmental pulmonary arterial   branches due to respiratory motion artifact. No pulmonary arterial emboli   within the other well visualized pulmonary arteries.    Left upper anterior chest wall cardiac device with the leads terminating   within the right atrium and right ventricle.    LYMPH NODES/MEDIASTINUM: Enlarged anterior mediastinal nodule likely a   lymph node measuring about 1.8 cm. 2 adjacent left internal mammary chain   lymph nodes largest measuring about 1.5 cm.    HEART: No pericardial effusion. Vascular calcifications with involvement   of the aorta and the coronary arteries. Cardiomegaly with right heart   chamber enlargement. Mitral annular repair. Mitral valve clip. Main   pulmonary artery is enlarged measuring about 4.2 cm suggestive of   pulmonary arterial hypertension.    UPPER ABDOMEN: Elevation of the left hemidiaphragm. Partially imaged left   renal cyst. Prominence of the partially imaged left renal collecting   system not well evaluated.    LUNGS/PLEURA: 8 x 10 cm intrathoracic fluid collection within the left   mid to lower posterior hemithorax likely within the pleural space.   Adjacent small left pleural effusion with areas of loculation.    Mild left lower lobe partial compressive atelectasis adjacent to the   left-sided loculated fluid collection and adjacent to the elevated left   hemidiaphragm.    Vertically oriented the left lower lobe paramediastinal patchy opacity   with some associated volume loss centered within the superior segment   suggestive of atelectasis possibly with superimposed infection. Minimal   subsegmental areas of atelectasis within the medial segment of the right   middle lobe and lingula adjacent to the anterior mediastinum.    Cluster of a few ill-defined right upper lobe nodular opacities towards   the apex.    CHEST WALL: Spinal Degenerative Changes. Left shoulder surgery.    IMPRESSION:    The PE study is limited for evaluation of some of the subsegmental   pulmonary arterial branches due to respiratory motion artifact. No   pulmonary arterial emboli within the other well visualized pulmonary   arteries.    8 x 10 cm left mid to lower posterior hemithorax loculated fluid   collection likely within the pleural space. Adjacent small left pleural   effusion with areas of loculation.    Few bilateral nodular and patchy opacities as described above may be of   infectious etiology superimposed on atelectasis.    Mediastinal and left internal mammary chain lymphadenopathy.    Mild prominence of the partially imaged left renal collecting system is   not well evaluated. Renal ultrasound is recommended for further   evaluation.    Comparison with prior chest CTs is recommended for further evaluation of   the above findings and if images are made available, an addendum can be   issued.    --- End of Report ---            NICOLE LEESA MD; Attending Radiologist  This document has been electronically signed. May 30 2025  3:29PM    < end of copied text >    PROBLEM LIST:  89y Male with HEALTH ISSUES - PROBLEM Dx:            RECS:  broad spectrum abx  appreciate CTS/ IR teams  would send out fluid for path, gram stain, cell count, ldh, protein  not on bronchodilators outpatient  will need repeat scan eventually post procedure  use incentive spirometer  albuterol HFA PRN  f/u with Dr Price    afib not on Eliquis for procedure- defer to cardio but should he be on heparin ggt for AC  until procedure?        Please call with any questions.    Claudette Acevedo, DO  [facility] Pulmonary/Sleep Medicine  [phone]

## 2025-06-01 PROCEDURE — 99233 SBSQ HOSP IP/OBS HIGH 50: CPT | Mod: 57

## 2025-06-01 RX ORDER — ALPRAZOLAM 0.5 MG
0.12 TABLET, EXTENDED RELEASE 24 HR ORAL AT BEDTIME
Refills: 0 | Status: DISCONTINUED | OUTPATIENT
Start: 2025-06-01 | End: 2025-06-04

## 2025-06-01 RX ADMIN — FUROSEMIDE 20 MILLIGRAM(S): 10 INJECTION INTRAMUSCULAR; INTRAVENOUS at 13:05

## 2025-06-01 RX ADMIN — Medication 25 GRAM(S): at 13:06

## 2025-06-01 RX ADMIN — Medication 40 MILLIGRAM(S): at 08:34

## 2025-06-01 RX ADMIN — ESCITALOPRAM OXALATE 10 MILLIGRAM(S): 20 TABLET ORAL at 13:05

## 2025-06-01 RX ADMIN — HEPARIN SODIUM 5000 UNIT(S): 1000 INJECTION INTRAVENOUS; SUBCUTANEOUS at 13:07

## 2025-06-01 RX ADMIN — HEPARIN SODIUM 5000 UNIT(S): 1000 INJECTION INTRAVENOUS; SUBCUTANEOUS at 05:43

## 2025-06-01 RX ADMIN — Medication 25 GRAM(S): at 05:45

## 2025-06-01 RX ADMIN — DILTIAZEM HYDROCHLORIDE 180 MILLIGRAM(S): 240 TABLET, EXTENDED RELEASE ORAL at 05:44

## 2025-06-01 RX ADMIN — LOSARTAN POTASSIUM 25 MILLIGRAM(S): 100 TABLET, FILM COATED ORAL at 05:42

## 2025-06-01 RX ADMIN — FUROSEMIDE 20 MILLIGRAM(S): 10 INJECTION INTRAMUSCULAR; INTRAVENOUS at 05:43

## 2025-06-01 RX ADMIN — Medication 25 GRAM(S): at 21:04

## 2025-06-01 RX ADMIN — Medication 20 MILLIEQUIVALENT(S): at 13:04

## 2025-06-01 RX ADMIN — ROSUVASTATIN CALCIUM 10 MILLIGRAM(S): 20 TABLET, FILM COATED ORAL at 05:43

## 2025-06-01 RX ADMIN — Medication 0.12 MILLIGRAM(S): at 21:03

## 2025-06-01 NOTE — CHART NOTE - NSCHARTNOTEFT_GEN_A_CORE
PRE-INTERVENTIONAL RADIOLOGY PROCEDURE NOTE  Patient Age: 89  Patient Gender: Male  Procedure: L pleural effusion drainage with pigtail catheter  Diagnosis/Indication: L Loculated Pleural Effusion  Interventional Radiology Attending Physician: Dr Dickey  Ordering Attending Physician: Dr Moore  Pertinent Medical History: AFib, PPM, HTN, HLD, h/o Mitral clip, CHF, Depression, [place], Bladder ca  Pertinent labs: CBC, BMP, PT/PTT/INR          Patient and Family Aware: Yes

## 2025-06-01 NOTE — PROGRESS NOTE ADULT - NS ATTEND AMEND GEN_ALL_CORE FT
patient sitting up in chair, comfortable in no respiratory distress.   family at bedside, discussed again plan for drainage with IR tomorrow. patient inquired about discharge following procedure to which team explained unlikely as we would want to monitor output -and decide further steps
patient with elevated diaphragm and collection within left chest. planning on holding AC and IR drainage on Monday.   patient inquired as to plan following drainage - discussed possibilities depended on whether able to completely evacuate collection but Dr. Moore would decide and discuss with him.

## 2025-06-01 NOTE — PROGRESS NOTE ADULT - SUBJECTIVE AND OBJECTIVE BOX
Subjective: No new complaints. Feels ok. No fever, chills. OOB ad clare. On RA.     Vital Signs:  Vital Signs Last 24 Hrs  T(C): 36.5 (06-01-25 @ 08:45), Max: 36.8 (05-31-25 @ 12:32)  T(F): 97.7 (06-01-25 @ 08:45), Max: 98.2 (05-31-25 @ 12:32)  HR: 72 (06-01-25 @ 08:45) (71 - 81)  BP: 129/76 (06-01-25 @ 08:45) (108/61 - 130/67)  RR: 19 (06-01-25 @ 08:45) (18 - 19)  SpO2: 98% (06-01-25 @ 08:45) (96% - 100%) on (O2)    Telemetry/Alarms: Paced  Relevant labs, radiology and Medications reviewed           CXR with left loculated effusion             11.8   6.47  )-----------( 283      ( 31 May 2025 06:02 )             36.7     05-31    140  |  105  |  17  ----------------------------<  91  4.4   |  24  |  1.21    Ca    9.3      31 May 2025 06:02    TPro  6.8  /  Alb  3.5  /  TBili  0.8  /  DBili  x   /  AST  21  /  ALT  13  /  AlkPhos  83  05-30    PT/INR - ( 31 May 2025 06:02 )   PT: 14.1 sec;   INR: 1.22 ratio         PTT - ( 30 May 2025 12:40 )  PTT:33.8 sec  MEDICATIONS  (STANDING):  diltiazem    milliGRAM(s) Oral daily  escitalopram 10 milliGRAM(s) Oral daily  furosemide    Tablet 20 milliGRAM(s) Oral two times a day  heparin   Injectable 5000 Unit(s) SubCutaneous every 8 hours  losartan 25 milliGRAM(s) Oral daily  pantoprazole    Tablet 40 milliGRAM(s) Oral before breakfast  piperacillin/tazobactam IVPB.. 3.375 Gram(s) IV Intermittent every 8 hours  polyethylene glycol 3350 17 Gram(s) Oral daily  potassium chloride    Tablet ER 20 milliEquivalent(s) Oral daily  rosuvastatin 10 milliGRAM(s) Oral daily  senna 2 Tablet(s) Oral at bedtime  tamsulosin 0.8 milliGRAM(s) Oral at bedtime    MEDICATIONS  (PRN):  bisacodyl Suppository 10 milliGRAM(s) Rectal daily PRN Constipation    General: WD NAD  Neurology: A&Ox3, nonfocal, SANTIZO x 4  Eyes: PERRLA/ EOMI, Gross vision intact  ENT/Neck: Neck supple, trachea midline, No JVD, Gross hearing intact  Respiratory: Dec BS to left base  CV: RRR, S1S2, no murmurs, rubs or gallops  Abdominal: Soft, NT, ND +BS,   Extremities: No edema, + peripheral pulses  Skin: No Rashes, Hematoma, Ecchymosis    I&O's Summary    31 May 2025 07:01  -  01 Jun 2025 07:00  --------------------------------------------------------  IN: 1660 mL / OUT: 2405 mL / NET: -745 mL    01 Jun 2025 07:01  -  01 Jun 2025 10:23  --------------------------------------------------------  IN: 350 mL / OUT: 450 mL / NET: -100 mL    Social  , lives with wife  no ETOH  Former smoker    Assessment  89y Male  w/ PAST MEDICAL & SURGICAL HISTORY:  Atrial Fibrillation      Hypertension      Hyperlipemia      Herniated Disc      History of Ventricular Tachycardia      Depression      Bilateral Recurrent Inguinal Hernia  s/p surgical repair      Rotator Cuff Tear  left , s/p  surgical repair      History of lump in breast  right      MVP (mitral valve prolapse)      Bladder cancer      Bilat Ing Hernia  s/p surgical repair      RCT (Rotator Cuff Tear)  left -, s/p surgical repair      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 Price) for 1 month c/o generally not feeling well. Pt states 3-4 wks ago he felt like he was sick with a cold or URI, experiencing cough, low grade fever, malaise and has continued to have cough with fatigue and occasional SOB since.   Pt found to have left loculated pleural effusion. Admitted for IV antibiotics, Pulm consult. Plan for IR drainage on Monday 6/2.       PLAN  Neuro: Pain management  Pulm: Encourage coughing, deep breathing and use of incentive spirometry. Daily CXR.   Cardio: Monitor telemetry/alarms. Cont Cardiac meds. Eliquis on hold for procedure. May resume Heparin gtt after procedure.   GI: Tolerating diet. Continue stool softeners.  Renal: monitor urine output, supplement electrolytes as needed  Vasc: Heparin SC/SCDs for DVT prophylaxis  Heme: Stable H/H. .   ID: Cont Zosyn. F/u sputum culture  Therapy: OOB/ambulate  Disposition: Plan IR placement of left pleural pigtail catheter.   Discussed with Cardiothoracic Team at AM rounds.

## 2025-06-02 ENCOUNTER — RESULT REVIEW (OUTPATIENT)
Age: 89
End: 2025-06-02

## 2025-06-02 LAB
ANION GAP SERPL CALC-SCNC: 12 MMOL/L — SIGNIFICANT CHANGE UP (ref 7–14)
APTT BLD: 30 SEC — SIGNIFICANT CHANGE UP (ref 26.1–36.8)
B PERT IGG+IGM PNL SER: ABNORMAL
BUN SERPL-MCNC: 16 MG/DL — SIGNIFICANT CHANGE UP (ref 7–23)
CALCIUM SERPL-MCNC: 8.7 MG/DL — SIGNIFICANT CHANGE UP (ref 8.4–10.5)
CHLORIDE SERPL-SCNC: 103 MMOL/L — SIGNIFICANT CHANGE UP (ref 98–107)
CO2 SERPL-SCNC: 23 MMOL/L — SIGNIFICANT CHANGE UP (ref 22–31)
COLOR FLD: ABNORMAL
CREAT SERPL-MCNC: 1.27 MG/DL — SIGNIFICANT CHANGE UP (ref 0.5–1.3)
EGFR: 54 ML/MIN/1.73M2 — LOW
EGFR: 54 ML/MIN/1.73M2 — LOW
EOSINOPHIL # FLD: 0 % — SIGNIFICANT CHANGE UP
FLUID INTAKE SUBSTANCE CLASS: SIGNIFICANT CHANGE UP
FOLATE+VIT B12 SERBLD-IMP: 0 % — SIGNIFICANT CHANGE UP
GLUCOSE SERPL-MCNC: 88 MG/DL — SIGNIFICANT CHANGE UP (ref 70–99)
GRAM STN FLD: ABNORMAL
GRAM STN FLD: SIGNIFICANT CHANGE UP
HCT VFR BLD CALC: 33 % — LOW (ref 39–50)
HGB BLD-MCNC: 10.7 G/DL — LOW (ref 13–17)
INR BLD: 1.11 RATIO — SIGNIFICANT CHANGE UP (ref 0.85–1.16)
LYMPHOCYTES # FLD: 3 % — SIGNIFICANT CHANGE UP
MCHC RBC-ENTMCNC: 31.6 PG — SIGNIFICANT CHANGE UP (ref 27–34)
MCHC RBC-ENTMCNC: 32.4 G/DL — SIGNIFICANT CHANGE UP (ref 32–36)
MCV RBC AUTO: 97.3 FL — SIGNIFICANT CHANGE UP (ref 80–100)
MESOTHL CELL # FLD: 0 % — SIGNIFICANT CHANGE UP
MONOS+MACROS # FLD: 2 % — SIGNIFICANT CHANGE UP
NEUTROPHILS-BODY FLUID: 95 % — SIGNIFICANT CHANGE UP
NRBC # BLD AUTO: 0 K/UL — SIGNIFICANT CHANGE UP (ref 0–0)
NRBC # FLD: 0 K/UL — SIGNIFICANT CHANGE UP (ref 0–0)
NRBC BLD AUTO-RTO: 0 /100 WBCS — SIGNIFICANT CHANGE UP (ref 0–0)
OTHER CELLS FLD MANUAL: 0 % — SIGNIFICANT CHANGE UP
PLATELET # BLD AUTO: 269 K/UL — SIGNIFICANT CHANGE UP (ref 150–400)
POTASSIUM SERPL-MCNC: 3.7 MMOL/L — SIGNIFICANT CHANGE UP (ref 3.5–5.3)
POTASSIUM SERPL-SCNC: 3.7 MMOL/L — SIGNIFICANT CHANGE UP (ref 3.5–5.3)
PROTHROM AB SERPL-ACNC: 13.2 SEC — SIGNIFICANT CHANGE UP (ref 9.9–13.4)
RBC # BLD: 3.39 M/UL — LOW (ref 4.2–5.8)
RBC # FLD: 13.9 % — SIGNIFICANT CHANGE UP (ref 10.3–14.5)
RCV VOL RI: HIGH CELLS/UL (ref 0–5)
SODIUM SERPL-SCNC: 138 MMOL/L — SIGNIFICANT CHANGE UP (ref 135–145)
SPECIMEN SOURCE FLD: SIGNIFICANT CHANGE UP
SPECIMEN SOURCE: SIGNIFICANT CHANGE UP
SPECIMEN SOURCE: SIGNIFICANT CHANGE UP
TOTAL CELLS COUNTED, BODY FLUID: 100 CELLS — SIGNIFICANT CHANGE UP
TOTAL NUCLEATED CELL COUNT, BODY FLUID: 507 CELLS/UL — HIGH (ref 0–5)
TUBE TYPE: SIGNIFICANT CHANGE UP
WBC # BLD: 6.51 K/UL — SIGNIFICANT CHANGE UP (ref 3.8–10.5)
WBC # FLD AUTO: 6.51 K/UL — SIGNIFICANT CHANGE UP (ref 3.8–10.5)

## 2025-06-02 PROCEDURE — 99232 SBSQ HOSP IP/OBS MODERATE 35: CPT

## 2025-06-02 PROCEDURE — 88305 TISSUE EXAM BY PATHOLOGIST: CPT | Mod: 26

## 2025-06-02 PROCEDURE — 88112 CYTOPATH CELL ENHANCE TECH: CPT | Mod: 26

## 2025-06-02 PROCEDURE — 93306 TTE W/DOPPLER COMPLETE: CPT | Mod: 26

## 2025-06-02 PROCEDURE — 71045 X-RAY EXAM CHEST 1 VIEW: CPT | Mod: 26

## 2025-06-02 PROCEDURE — 32557 INSERT CATH PLEURA W/ IMAGE: CPT | Mod: LT

## 2025-06-02 RX ORDER — ACETAMINOPHEN 500 MG/5ML
975 LIQUID (ML) ORAL EVERY 6 HOURS
Refills: 0 | Status: DISCONTINUED | OUTPATIENT
Start: 2025-06-02 | End: 2025-06-04

## 2025-06-02 RX ORDER — HEPARIN SODIUM 1000 [USP'U]/ML
5000 INJECTION INTRAVENOUS; SUBCUTANEOUS EVERY 8 HOURS
Refills: 0 | Status: DISCONTINUED | OUTPATIENT
Start: 2025-06-02 | End: 2025-06-02

## 2025-06-02 RX ADMIN — DILTIAZEM HYDROCHLORIDE 180 MILLIGRAM(S): 240 TABLET, EXTENDED RELEASE ORAL at 05:20

## 2025-06-02 RX ADMIN — Medication 25 GRAM(S): at 21:56

## 2025-06-02 RX ADMIN — LOSARTAN POTASSIUM 25 MILLIGRAM(S): 100 TABLET, FILM COATED ORAL at 05:20

## 2025-06-02 RX ADMIN — FUROSEMIDE 20 MILLIGRAM(S): 10 INJECTION INTRAMUSCULAR; INTRAVENOUS at 05:20

## 2025-06-02 RX ADMIN — Medication 975 MILLIGRAM(S): at 21:22

## 2025-06-02 RX ADMIN — ESCITALOPRAM OXALATE 10 MILLIGRAM(S): 20 TABLET ORAL at 13:53

## 2025-06-02 RX ADMIN — Medication 975 MILLIGRAM(S): at 20:52

## 2025-06-02 RX ADMIN — Medication 0.12 MILLIGRAM(S): at 21:59

## 2025-06-02 RX ADMIN — Medication 2 TABLET(S): at 20:51

## 2025-06-02 RX ADMIN — Medication 25 GRAM(S): at 13:48

## 2025-06-02 RX ADMIN — ROSUVASTATIN CALCIUM 10 MILLIGRAM(S): 20 TABLET, FILM COATED ORAL at 05:20

## 2025-06-02 RX ADMIN — Medication 25 GRAM(S): at 05:21

## 2025-06-02 NOTE — PROCEDURE NOTE - PROCEDURE FINDINGS AND DETAILS
Successful R. pleural effusion drainage w/100cc of dark red fluid aspirated  -Chest tube mgmnt by thoracic sx  -ok to resume dvt and therapeutic AC in 24 hrs Successful L. pleural effusion drainage w/100cc of dark red fluid aspirated  -Chest tube mgmnt by thoracic sx  -ok to resume dvt and therapeutic AC in 24 hrs

## 2025-06-02 NOTE — CHART NOTE - NSCHARTNOTEFT_GEN_A_CORE
Pt is s/p left pleural effusion drainage. Dressing is C/D/I. ~400cc serosanguinous output noted in Pleur-evac, attached to suction. Pt is s/p left pleural effusion drainage. Dressing is C/D/I. ~400cc serosanguinous output noted in Pleur-evac, attached to suction. Post-procedure CXR reviewed by LITA Shetty - no pneumothorax.    Pt able to tolerate PO intake, denies any nausea at this time. Pain is also controlled.    Cleared to be transferred back to floor.

## 2025-06-02 NOTE — PRE PROCEDURE NOTE - PRE PROCEDURE EVALUATION
Interventional Radiology    HPI:89yo M with h/o Afib, heart failure, MVR, s/p Mitral clip, PPM, HLD, HLD, depression, [place], Bladder CA (currently undergoing weekly intra bladder chemo) has been seeing his outpt PCP and Pulmonologist( Dr. Price) for 1 month c/o generally not feeling well. Pt states 3-4 wks ago he felt like he was sick with a cold or URI, experiencing cough, low grade fever, malaise and has continued to have cough with fatigue and occasional SOB since.   Pt found to have left loculated pleural effusion. Admitted for IV antibiotics, Pulm consult.    Review of Systems:   Constitutional: NCAT, well-appearing   Respiratory: No shortness of breath  Cardiovascular: No chest pain  Gastrointestinal: No abdominal or epigastric pain. No nausea or vomiting.    Allergies: No Known Allergies    Medications (Abx/Cardiac/Anticoagulation/Blood Products)  diltiazem   CD: 180 milliGRAM(s) Oral (06-02 @ 05:20)  furosemide    Tablet: 20 milliGRAM(s) Oral (06-02 @ 05:20)  heparin   Injectable: 5000 Unit(s) SubCutaneous (06-01 @ 13:07)  losartan: 25 milliGRAM(s) Oral (06-02 @ 05:20)  piperacillin/tazobactam IVPB..: 25 mL/Hr IV Intermittent (06-02 @ 13:48)    Data:    T(C): 36.5  HR: 81  BP: 147/70  RR: 18  SpO2: 97%    -WBC 6.51 / HgB 10.7 / Hct 33.0 / Plt 269  -Na 138 / Cl 103 / BUN 16 / Glucose 88  -K 3.7 / CO2 23 / Cr 1.27  -ALT -- / Alk Phos -- / T.Bili --  -INR1.11      Physical Exam  General: No acute distress, nontoxic, A&Ox3  Chest: Non labored breathing  Skin: No rashes or lesions    RADIOLOGY & ADDITIONAL TESTS:    Imaging Reviewed    H & P Note Reviewed from: 5/30/25    Plan: 89y Male presents for L. chest tube placement  -Risks/Benefits/alternatives explained with the patient and/or healthcare proxy and witnessed informed consent obtained.

## 2025-06-02 NOTE — PROGRESS NOTE ADULT - SUBJECTIVE AND OBJECTIVE BOX
Subjective: patient seen and examined with thoracic surgery team  pt without acute complaints  pain controlled  pt denies chest pain or shortness of breath  using incentive spirometer  on bowel regimen, +flatus, +BM  ambulatory with assistance  plan for IR placement of L loculated effusion  d/w attending on morning TEAMS report      Vital Signs:  Vital Signs Last 24 Hrs  T(C): 36.4 (06-02-25 @ 05:39), Max: 36.7 (06-01-25 @ 12:09)  T(F): 97.5 (06-02-25 @ 05:39), Max: 98.1 (06-01-25 @ 12:09)  HR: 74 (06-02-25 @ 05:39) (61 - 74)  BP: 133/72 (06-02-25 @ 05:39) (111/63 - 133/72)  RR: 20 (06-02-25 @ 05:39) (16 - 20)  SpO2: 97% (06-02-25 @ 05:39) (96% - 99%) on (O2)      PE  Telemetry/Alarms: SR  General: awake and alert NAD  Neurology: A&Ox3, nonfocal, SANTIZO x 4  Respiratory: CTA B/L; decreased bases B/L  CV: RRR, S1S2, no murmurs, rubs or gallops  Abdominal: Soft, NT, ND +BS, Last BM  Extremities: No edema, + peripheral pulses    Relevant labs, radiology and Medications reviewed                        10.7   6.51  )-----------( 269      ( 02 Jun 2025 06:33 )             33.0     06-02    138  |  103  |  16  ----------------------------<  88  3.7   |  23  |  1.27    Ca    8.7      02 Jun 2025 06:33      PT/INR - ( 02 Jun 2025 06:33 )   PT: 13.2 sec;   INR: 1.11 ratio         PTT - ( 02 Jun 2025 06:33 )  PTT:30.0 sec  MEDICATIONS  (STANDING):  diltiazem    milliGRAM(s) Oral daily  escitalopram 10 milliGRAM(s) Oral daily  furosemide    Tablet 20 milliGRAM(s) Oral two times a day  losartan 25 milliGRAM(s) Oral daily  pantoprazole    Tablet 40 milliGRAM(s) Oral before breakfast  piperacillin/tazobactam IVPB.. 3.375 Gram(s) IV Intermittent every 8 hours  polyethylene glycol 3350 17 Gram(s) Oral daily  potassium chloride    Tablet ER 20 milliEquivalent(s) Oral daily  rosuvastatin 10 milliGRAM(s) Oral daily  senna 2 Tablet(s) Oral at bedtime  sodium chloride 0.9%. 1000 milliLiter(s) (75 mL/Hr) IV Continuous <Continuous>  tamsulosin 0.8 milliGRAM(s) Oral at bedtime    MEDICATIONS  (PRN):  ALPRAZolam 0.125 milliGRAM(s) Oral at bedtime PRN insomnia/anxiety  bisacodyl Suppository 10 milliGRAM(s) Rectal daily PRN Constipation    Pertinent Physical Exam  I&O's Summary    01 Jun 2025 07:01  -  02 Jun 2025 07:00  --------------------------------------------------------  IN: 1655 mL / OUT: 2050 mL / NET: -395 mL        Social  , lives with wife  no ETOH  Former smoker        PAST MEDICAL & SURGICAL HISTORY:  Atrial Fibrillation      Hypertension      Hyperlipemia      Herniated Disc      History of Ventricular Tachycardia      Depression      Bilateral Recurrent Inguinal Hernia  s/p surgical repair      Rotator Cuff Tear  left , s/p  surgical repair      History of lump in breast  right      MVP (mitral valve prolapse)      Bladder cancer      Bilat Ing Hernia  s/p surgical repair      RCT (Rotator Cuff Tear)  left -, s/p surgical repair      Pacemaker Insertion  8/26/09 [employer]tr[employer], model #[id number]      Lumbar Disc Surgery  Spinal stenosis  L5 S1 1996      S/P LASIK Surgery  2002 bilateral eyes      S/P Carpal Tunnel Release  left  2008, right 2010      History of cardiac radiofrequency ablation  9/2019      S/P coronary artery stent placement  2016- [facility]      S/P mitral valve clip implantation      History of total hip replacement, right      ASSESSMENT  89yo M with h/o Afib, heart failure, MVR, s/p Mitral clip, PPM, HLD, HLD, depression, [place], Bladder CA (currently undergoing weekly intra bladder chemo) has been seeing his outpt PCP and Pulmonologist( Dr. Price) for 1 month c/o generally not feeling well. Pt states 3-4 wks ago he felt like he was sick with a cold or URI, experiencing cough, low grade fever, malaise and has continued to have cough with fatigue and occasional SOB since.   Pt found to have left loculated pleural effusion. Admitted for IV antibiotics, Pulm consult. Plan for IR drainage on Monday 6/2.       PLAN  Neuro: Pain management  Pulm: Encourage coughing, deep breathing and use of incentive spirometry. Daily CXR.   Cardio: Monitor telemetry/alarms. Cont Cardiac meds. Eliquis on hold for procedure. May resume Heparin gtt after procedure.   GI: Tolerating diet. Continue stool softeners.  Renal: monitor urine output, supplement electrolytes as needed  Vasc: Heparin SC/SCDs for DVT prophylaxis  Heme: Stable H/H. .   ID: Cont Zosyn. F/u sputum culture  Therapy: OOB/ambulate  Disposition: Plan IR placement of left pleural pigtail catheter TODAY   Discussed with Cardiothoracic Team at AM rounds.

## 2025-06-03 LAB
ANION GAP SERPL CALC-SCNC: 10 MMOL/L — SIGNIFICANT CHANGE UP (ref 7–14)
BUN SERPL-MCNC: 18 MG/DL — SIGNIFICANT CHANGE UP (ref 7–23)
CALCIUM SERPL-MCNC: 9 MG/DL — SIGNIFICANT CHANGE UP (ref 8.4–10.5)
CHLORIDE SERPL-SCNC: 104 MMOL/L — SIGNIFICANT CHANGE UP (ref 98–107)
CO2 SERPL-SCNC: 25 MMOL/L — SIGNIFICANT CHANGE UP (ref 22–31)
CREAT SERPL-MCNC: 1.22 MG/DL — SIGNIFICANT CHANGE UP (ref 0.5–1.3)
CULTURE RESULTS: ABNORMAL
EGFR: 56 ML/MIN/1.73M2 — LOW
EGFR: 56 ML/MIN/1.73M2 — LOW
GLUCOSE SERPL-MCNC: 91 MG/DL — SIGNIFICANT CHANGE UP (ref 70–99)
HCT VFR BLD CALC: 33 % — LOW (ref 39–50)
HGB BLD-MCNC: 11.2 G/DL — LOW (ref 13–17)
MCHC RBC-ENTMCNC: 32.7 PG — SIGNIFICANT CHANGE UP (ref 27–34)
MCHC RBC-ENTMCNC: 33.9 G/DL — SIGNIFICANT CHANGE UP (ref 32–36)
MCV RBC AUTO: 96.5 FL — SIGNIFICANT CHANGE UP (ref 80–100)
NIGHT BLUE STAIN TISS: SIGNIFICANT CHANGE UP
NRBC # BLD AUTO: 0 K/UL — SIGNIFICANT CHANGE UP (ref 0–0)
NRBC # FLD: 0 K/UL — SIGNIFICANT CHANGE UP (ref 0–0)
NRBC BLD AUTO-RTO: 0 /100 WBCS — SIGNIFICANT CHANGE UP (ref 0–0)
PLATELET # BLD AUTO: 258 K/UL — SIGNIFICANT CHANGE UP (ref 150–400)
POTASSIUM SERPL-MCNC: 3.8 MMOL/L — SIGNIFICANT CHANGE UP (ref 3.5–5.3)
POTASSIUM SERPL-SCNC: 3.8 MMOL/L — SIGNIFICANT CHANGE UP (ref 3.5–5.3)
RBC # BLD: 3.42 M/UL — LOW (ref 4.2–5.8)
RBC # FLD: 13.7 % — SIGNIFICANT CHANGE UP (ref 10.3–14.5)
SODIUM SERPL-SCNC: 139 MMOL/L — SIGNIFICANT CHANGE UP (ref 135–145)
SPECIMEN SOURCE: SIGNIFICANT CHANGE UP
SPECIMEN SOURCE: SIGNIFICANT CHANGE UP
WBC # BLD: 6.97 K/UL — SIGNIFICANT CHANGE UP (ref 3.8–10.5)
WBC # FLD AUTO: 6.97 K/UL — SIGNIFICANT CHANGE UP (ref 3.8–10.5)

## 2025-06-03 PROCEDURE — 71045 X-RAY EXAM CHEST 1 VIEW: CPT | Mod: 26

## 2025-06-03 PROCEDURE — 99233 SBSQ HOSP IP/OBS HIGH 50: CPT

## 2025-06-03 PROCEDURE — 99232 SBSQ HOSP IP/OBS MODERATE 35: CPT

## 2025-06-03 RX ADMIN — Medication 975 MILLIGRAM(S): at 03:07

## 2025-06-03 RX ADMIN — ESCITALOPRAM OXALATE 10 MILLIGRAM(S): 20 TABLET ORAL at 13:15

## 2025-06-03 RX ADMIN — Medication 25 GRAM(S): at 21:03

## 2025-06-03 RX ADMIN — FUROSEMIDE 20 MILLIGRAM(S): 10 INJECTION INTRAMUSCULAR; INTRAVENOUS at 05:23

## 2025-06-03 RX ADMIN — Medication 0.12 MILLIGRAM(S): at 21:03

## 2025-06-03 RX ADMIN — Medication 975 MILLIGRAM(S): at 03:37

## 2025-06-03 RX ADMIN — DILTIAZEM HYDROCHLORIDE 180 MILLIGRAM(S): 240 TABLET, EXTENDED RELEASE ORAL at 05:23

## 2025-06-03 RX ADMIN — LOSARTAN POTASSIUM 25 MILLIGRAM(S): 100 TABLET, FILM COATED ORAL at 05:23

## 2025-06-03 RX ADMIN — ROSUVASTATIN CALCIUM 10 MILLIGRAM(S): 20 TABLET, FILM COATED ORAL at 05:24

## 2025-06-03 RX ADMIN — Medication 20 MILLIEQUIVALENT(S): at 13:16

## 2025-06-03 RX ADMIN — Medication 2 TABLET(S): at 21:03

## 2025-06-03 RX ADMIN — FUROSEMIDE 20 MILLIGRAM(S): 10 INJECTION INTRAMUSCULAR; INTRAVENOUS at 13:15

## 2025-06-03 RX ADMIN — Medication 25 GRAM(S): at 14:21

## 2025-06-03 RX ADMIN — Medication 25 GRAM(S): at 05:24

## 2025-06-03 NOTE — PROGRESS NOTE ADULT - PROVIDER SPECIALTY LIST ADULT
Pulmonology
Pulmonology
Thoracic Surgery
CT Surgery
Intervent Radiology
Thoracic Surgery
Thoracic Surgery

## 2025-06-03 NOTE — PROGRESS NOTE ADULT - REASON FOR ADMISSION
Cough, fatigue, pleural nodule

## 2025-06-03 NOTE — PROGRESS NOTE ADULT - ASSESSMENT
89y Male with history of bladder CA and low grade fevers, cough and general malaise with loculated left pleural effusion. IR is consulted for left pleural effusion drainage. Patient is now s/p left chest tube placement on 6/2/25 in Interventional Radiology.     Plan:  - Continue drainage, monitor output  - Chest tube management per CT surgery      [id number]

## 2025-06-03 NOTE — PROGRESS NOTE ADULT - SUBJECTIVE AND OBJECTIVE BOX
PULMONARY PROGRESS NOTE    ESTHER GOMEZ  MRN-[id number]    Patient is a 89y old  Male who presents with a chief complaint of Cough, fatigue, pleural nodule (2025 09:21)      HPI:  s/p pigtail left    -    ROS:   -    ACTIVE MEDICATION LIST:  MEDICATIONS  (STANDING):  diltiazem    milliGRAM(s) Oral daily  escitalopram 10 milliGRAM(s) Oral daily  furosemide    Tablet 20 milliGRAM(s) Oral two times a day  losartan 25 milliGRAM(s) Oral daily  pantoprazole    Tablet 40 milliGRAM(s) Oral before breakfast  piperacillin/tazobactam IVPB.. 3.375 Gram(s) IV Intermittent every 8 hours  polyethylene glycol 3350 17 Gram(s) Oral daily  potassium chloride    Tablet ER 20 milliEquivalent(s) Oral daily  rosuvastatin 10 milliGRAM(s) Oral daily  senna 2 Tablet(s) Oral at bedtime  tamsulosin 0.8 milliGRAM(s) Oral at bedtime    MEDICATIONS  (PRN):  acetaminophen     Tablet .. 975 milliGRAM(s) Oral every 6 hours PRN Temp greater or equal to 38C (100.4F), Mild Pain (1 - 3)  ALPRAZolam 0.125 milliGRAM(s) Oral at bedtime PRN insomnia/anxiety  bisacodyl Suppository 10 milliGRAM(s) Rectal daily PRN Constipation      EXAM:  Vital Signs Last 24 Hrs  T(C): 36.4 (2025 05:00), Max: 36.9 (2025 17:01)  T(F): 97.5 (2025 05:00), Max: 98.4 (2025 17:01)  HR: 95 (2025 05:00) (63 - 95)  BP: 113/65 (2025 05:00) (113/65 - 174/97)  BP(mean): --  RR: 18 (2025 05:00) (16 - 22)  SpO2: 97% (2025 05:00) (96% - 99%)    Parameters below as of 2025 05:00  Patient On (Oxygen Delivery Method): room air        GENERAL: The patient is awake and alert in no apparent distress.     LUNGS: Crackle left base                          11.2   6.97  )-----------( 258      ( 2025 05:05 )             33.0       06-03    139  |  104  |  18  ----------------------------<  91  3.8   |  25  |  1.22    Ca    9.0      2025 05:05       < from: Xray Chest 1 View- PORTABLE-Urgent (Xray Chest 1 View- PORTABLE-Urgent .) (25 @ 17:54) >    ACC: [id number] EXAM:  XR CHEST PORTABLE URGENT 1V   ORDERED BY: LISA QUINN     PROCEDURE DATE:  2025          INTERPRETATION:  EXAMINATION: XR CHEST URGENT    CLINICAL INDICATION: Follow-up for chest tube placement    TECHNIQUE: Single frontal portable view of the chest from 2025 5:54 PM    COMPARISON: Chest x-ray chest x-ray 2025, CT chest 2025.    FINDINGS:    The heart is displaced to the right side with prominent right atrial   contour.  Elevated left hemidiaphragm promoting the mediastinal displacement.  Left basilar atelectasis.  No focal consolidation.  Interval placement of a left pigtail chest tube. Stable left pleural   effusion.  No pneumothorax.    IMPRESSION:  Interval placement of left chest tube with stable left pleural effusion.  No pneumothorax.    --- End of Report ---          NADIA WAYNE MD; Resident Radiologist  This document has been electronically signed.  ADALID FARRELL MD; Attending Radiologist  This document has been electronically signed. 2025  7:08PM    < end of copied text >  PROCEDURE:   · Procedure Name	Interventional Radiology  · Procedure Name	Right pleural effusion drainage  · TIME OUT	Patient's first and last name, , procedure, and correct site confirmed prior to the start of procedure.  · Procedure Date/Time	2025 16:36  · Informed Consent	Benefits, risks, and possible complications of procedure explained to patient/caregiver who verbalized understanding and gave written consent.  · Procedure Performed By	Attending  · Procedure Assisted By	Resident  · Specimen Obtained	Fluid sent for cytology, Fluid sent for gram stain and culture  · Estimated Blood Loss	Minimal  · Complications	No complications  · Contrast	None  · Sedation	Provided by Anesthesia Department  · Local Anesthesia	1% Lidocaine  · Procedure Findings and Details	Successful R. pleural effusion drainage w/100cc of dark red fluid aspirated  -Chest tube mgmnt by thoracic sx  -ok to resume dvt and therapeutic AC in 24 hrs      Electronic Signatures:  Ken Mcdonald)  (Signed 2025 16:38)  	Authored: PROCEDURE  Adolfo Hdz)  (Signature Pending)  	Co-Signer: PROCEDURE      Last Updated: 2025 16:38 by Ken Mcdonald)    PROBLEM LIST:  89y Male with HEALTH ISSUES - PROBLEM Dx:            RECS:  appreicate CTS/ IR  fu path on fl uid  ambulate as tolerated  use incentive spirometer  outpatient fu with Dr Price/ repeat CT chest  defer to primary team re: restarting eliquis for his afib      Please call with any questions.    Claudette Acevedo DO  [facility] Pulmonary/Sleep Medicine  [phone]

## 2025-06-03 NOTE — PROGRESS NOTE ADULT - SUBJECTIVE AND OBJECTIVE BOX
89y Male s/p left chest tube placement on 6/2/25 in Interventional Radiology.     Patient seen and examined bedside resting comfortably. Patient denies SOB, chest pain, fever/chills. Reports feeling well post procedure.     T(F): 97.5 (06-03-25 @ 09:27), Max: 98.4 (06-02-25 @ 17:01)  HR: 76 (06-03-25 @ 09:27) (63 - 95)  BP: 94/63 (06-03-25 @ 09:27) (94/63 - 174/97)  RR: 17 (06-03-25 @ 09:27) (16 - 22)  SpO2: 97% (06-03-25 @ 09:27) (96% - 99%)  Wt(kg): --    LABS:                        11.2   6.97  )-----------( 258      ( 03 Jun 2025 05:05 )             33.0     06-03    139  |  104  |  18  ----------------------------<  91  3.8   |  25  |  1.22    Ca    9.0      03 Jun 2025 05:05      PT/INR - ( 02 Jun 2025 06:33 )   PT: 13.2 sec;   INR: 1.11 ratio         PTT - ( 02 Jun 2025 06:33 )  PTT:30.0 sec  I&O's Detail    02 Jun 2025 07:01  -  03 Jun 2025 07:00  --------------------------------------------------------  IN:    Oral Fluid: 480 mL  Total IN: 480 mL    OUT:    Chest Tube (mL): 280 mL    Voided (mL): 1700 mL  Total OUT: 1980 mL    Total NET: -1500 mL      03 Jun 2025 07:01  -  03 Jun 2025 10:42  --------------------------------------------------------  IN:  Total IN: 0 mL    OUT:    Voided (mL): 300 mL  Total OUT: 300 mL    Total NET: -300 mL      PHYSICAL EXAM:  General: Nontoxic, in NAD  Left Chest Tube: Dressing c/d/i

## 2025-06-03 NOTE — PROGRESS NOTE ADULT - SUBJECTIVE AND OBJECTIVE BOX
Subjective:   Patient seen and examined at bedside. Family present.  OOB to chair on RA. Using incentive spirometer.  No complaints. Reports improvement of pain post-procedure, denies SOB, CP.  s/p IR L PTC placement yesterday  Continues to express desire to go home.      Physical Exam:  General: WN/WD NAD  Neurology: Awake, nonfocal, SANTIZO x 4  Eyes: Scleras clear, PERRLA/ EOMI, Gross vision intact  ENT:Gross hearing intact, grossly patent pharynx, no stridor  Neck: Neck supple, trachea midline, No JVD,   Respiratory: CTA B/L, No wheezing, rales, rhonchi  CV: RRR, S1S2, no murmurs, rubs or gallops  Abdominal: Soft, NT, ND +BS,   Extremities: No edema, + peripheral pulses  Skin: No Rashes, Hematoma, Ecchymosis  Lymphatic: No Neck, axilla, groin LAD  Psych: Oriented x 3, normal affect  Incisions: c/d/i  Tubes: L PTC to sxn, output = 280cc/24hr, dark SS    I&O's Summary    02 Jun 2025 07:01  -  03 Jun 2025 07:00  --------------------------------------------------------  IN: 480 mL / OUT: 1980 mL / NET: -1500 mL    03 Jun 2025 07:01  -  03 Jun 2025 12:08  --------------------------------------------------------  IN: 0 mL / OUT: 500 mL / NET: -500 mL        Vitals:  Vital Signs Last 24 Hrs  T(C): 36.4 (03 Jun 2025 09:27), Max: 36.9 (02 Jun 2025 17:01)  T(F): 97.5 (03 Jun 2025 09:27), Max: 98.4 (02 Jun 2025 17:01)  HR: 76 (03 Jun 2025 09:27) (63 - 95)  BP: 94/63 (03 Jun 2025 09:27) (94/63 - 174/97)  BP(mean): 73 (03 Jun 2025 09:27) (73 - 73)  RR: 17 (03 Jun 2025 09:27) (16 - 22)  SpO2: 97% (03 Jun 2025 09:27) (96% - 99%)    Parameters below as of 03 Jun 2025 09:27  Patient On (Oxygen Delivery Method): room air        Labs:                        11.2   6.97  )-----------( 258      ( 03 Jun 2025 05:05 )             33.0     06-03    139  |  104  |  18  ----------------------------<  91  3.8   |  25  |  1.22    Ca    9.0      03 Jun 2025 05:05      PT/INR - ( 02 Jun 2025 06:33 )   PT: 13.2 sec;   INR: 1.11 ratio         PTT - ( 02 Jun 2025 06:33 )  PTT:30.0 sec      Medications:  MEDICATIONS  (STANDING):  diltiazem    milliGRAM(s) Oral daily  escitalopram 10 milliGRAM(s) Oral daily  furosemide    Tablet 20 milliGRAM(s) Oral two times a day  losartan 25 milliGRAM(s) Oral daily  pantoprazole    Tablet 40 milliGRAM(s) Oral before breakfast  piperacillin/tazobactam IVPB.. 3.375 Gram(s) IV Intermittent every 8 hours  polyethylene glycol 3350 17 Gram(s) Oral daily  potassium chloride    Tablet ER 20 milliEquivalent(s) Oral daily  rosuvastatin 10 milliGRAM(s) Oral daily  senna 2 Tablet(s) Oral at bedtime  tamsulosin 0.8 milliGRAM(s) Oral at bedtime    MEDICATIONS  (PRN):  acetaminophen     Tablet .. 975 milliGRAM(s) Oral every 6 hours PRN Temp greater or equal to 38C (100.4F), Mild Pain (1 - 3)  ALPRAZolam 0.125 milliGRAM(s) Oral at bedtime PRN insomnia/anxiety  bisacodyl Suppository 10 milliGRAM(s) Rectal daily PRN Constipation      Allergies:    No Known Allergies        PMHx:    Atrial Fibrillation      Hypertension      Hyperlipemia      Herniated Disc      History of Ventricular Tachycardia      Depression      Bilateral Recurrent Inguinal Hernia  s/p surgical repair      Rotator Cuff Tear  left , s/p  surgical repair      History of lump in breast  right      MVP (mitral valve prolapse)      Bladder cancer      Bilat Ing Hernia  s/p surgical repair      RCT (Rotator Cuff Tear)  left -, s/p surgical repair      Pacemaker Insertion  8/26/09 [employer], model #[id number]      Lumbar Disc Surgery  Spinal stenosis  L5 S1 1996      S/P LASIK Surgery  2002 bilateral eyes      S/P Carpal Tunnel Release  left  2008, right 2010      History of cardiac radiofrequency ablation  9/2019      S/P coronary artery stent placement  2016- [facility]      S/P mitral valve clip implantation      History of total hip replacement, right      Assessment:  Patient is a 89y Male with PMHx of Afib (on eliquis last dose 5/30), heart failure, MVR, s/p Mitral clip, PPM, HLD, HLD, depression, [place], Bladder CA (currently undergoing weekly intra bladder chemo) has been seeing his outpt PCP and Pulmonologist (Dr. Price) for 1 month c/o generally not feeling well.   Pt states 3-4 wks ago he felt like he was sick with a cold or URI, experiencing cough, low grade fever, malaise and has continued to have cough with fatigue and occasional SOB since.   5/30 - CTA chest - left loculated pleural effusion. Admitted for IV antibiotics, Pulm consult. Plan for IR drainage on Monday 6/2 - IR placed L PTC    Plan:  Neuro: Pain management continued  Pulm: Encourage coughing, deep breathing and use of incentive spirometry. Daily CXR.   Cardio: Monitor telemetry/alarms  GI: Tolerating diet. Continue stool softeners.  Renal: monitor urine output, supplement electrolytes as needed  Vasc: SCDs for DVT prophylaxis, will discuss starting hep gtt at 24hr post PTC placement  Heme: Stable H/H.  ID: Cont [id number]. Stable.  Therapy: OOB/ambulate  Tubes: Monitor Chest tube output and record q shift  Disposition: Aim to D/C pending decreased output, pleural fluid cytology, PTC removal  Discussed with Cardiothoracic Team at AM rounds and Dr Moore.

## 2025-06-04 ENCOUNTER — TRANSCRIPTION ENCOUNTER (OUTPATIENT)
Age: 89
End: 2025-06-04

## 2025-06-04 ENCOUNTER — NON-APPOINTMENT (OUTPATIENT)
Age: 89
End: 2025-06-04

## 2025-06-04 VITALS
OXYGEN SATURATION: 98 % | TEMPERATURE: 98 F | RESPIRATION RATE: 18 BRPM | DIASTOLIC BLOOD PRESSURE: 62 MMHG | HEART RATE: 78 BPM | SYSTOLIC BLOOD PRESSURE: 126 MMHG

## 2025-06-04 PROBLEM — C67.9 MALIGNANT NEOPLASM OF BLADDER, UNSPECIFIED: Chronic | Status: ACTIVE | Noted: 2025-05-30

## 2025-06-04 LAB
ANION GAP SERPL CALC-SCNC: 8 MMOL/L — SIGNIFICANT CHANGE UP (ref 7–14)
BUN SERPL-MCNC: 18 MG/DL — SIGNIFICANT CHANGE UP (ref 7–23)
CALCIUM SERPL-MCNC: 9.2 MG/DL — SIGNIFICANT CHANGE UP (ref 8.4–10.5)
CHLORIDE SERPL-SCNC: 105 MMOL/L — SIGNIFICANT CHANGE UP (ref 98–107)
CO2 SERPL-SCNC: 27 MMOL/L — SIGNIFICANT CHANGE UP (ref 22–31)
CREAT SERPL-MCNC: 1.29 MG/DL — SIGNIFICANT CHANGE UP (ref 0.5–1.3)
EGFR: 53 ML/MIN/1.73M2 — LOW
EGFR: 53 ML/MIN/1.73M2 — LOW
GLUCOSE SERPL-MCNC: 89 MG/DL — SIGNIFICANT CHANGE UP (ref 70–99)
HCT VFR BLD CALC: 34.4 % — LOW (ref 39–50)
HGB BLD-MCNC: 11.5 G/DL — LOW (ref 13–17)
MAGNESIUM SERPL-MCNC: 2.2 MG/DL — SIGNIFICANT CHANGE UP (ref 1.6–2.6)
MCHC RBC-ENTMCNC: 32.3 PG — SIGNIFICANT CHANGE UP (ref 27–34)
MCHC RBC-ENTMCNC: 33.4 G/DL — SIGNIFICANT CHANGE UP (ref 32–36)
MCV RBC AUTO: 96.6 FL — SIGNIFICANT CHANGE UP (ref 80–100)
NON-GYNECOLOGICAL CYTOLOGY STUDY: SIGNIFICANT CHANGE UP
NRBC # BLD AUTO: 0 K/UL — SIGNIFICANT CHANGE UP (ref 0–0)
NRBC # FLD: 0 K/UL — SIGNIFICANT CHANGE UP (ref 0–0)
NRBC BLD AUTO-RTO: 0 /100 WBCS — SIGNIFICANT CHANGE UP (ref 0–0)
PHOSPHATE SERPL-MCNC: 3.3 MG/DL — SIGNIFICANT CHANGE UP (ref 2.5–4.5)
PLATELET # BLD AUTO: 277 K/UL — SIGNIFICANT CHANGE UP (ref 150–400)
POTASSIUM SERPL-MCNC: 3.9 MMOL/L — SIGNIFICANT CHANGE UP (ref 3.5–5.3)
POTASSIUM SERPL-SCNC: 3.9 MMOL/L — SIGNIFICANT CHANGE UP (ref 3.5–5.3)
RBC # BLD: 3.56 M/UL — LOW (ref 4.2–5.8)
RBC # FLD: 13.6 % — SIGNIFICANT CHANGE UP (ref 10.3–14.5)
SODIUM SERPL-SCNC: 140 MMOL/L — SIGNIFICANT CHANGE UP (ref 135–145)
WBC # BLD: 6.31 K/UL — SIGNIFICANT CHANGE UP (ref 3.8–10.5)
WBC # FLD AUTO: 6.31 K/UL — SIGNIFICANT CHANGE UP (ref 3.8–10.5)

## 2025-06-04 PROCEDURE — 71045 X-RAY EXAM CHEST 1 VIEW: CPT | Mod: 26

## 2025-06-04 RX ORDER — POLYETHYLENE GLYCOL 3350 17 G/17G
17 POWDER, FOR SOLUTION ORAL
Qty: 0 | Refills: 0 | DISCHARGE
Start: 2025-06-04

## 2025-06-04 RX ORDER — SENNA 187 MG
2 TABLET ORAL
Qty: 0 | Refills: 0 | DISCHARGE
Start: 2025-06-04

## 2025-06-04 RX ORDER — ACETAMINOPHEN 500 MG/5ML
3 LIQUID (ML) ORAL
Qty: 0 | Refills: 0 | DISCHARGE
Start: 2025-06-04

## 2025-06-04 RX ADMIN — Medication 40 MILLIGRAM(S): at 05:49

## 2025-06-04 RX ADMIN — FUROSEMIDE 20 MILLIGRAM(S): 10 INJECTION INTRAMUSCULAR; INTRAVENOUS at 05:50

## 2025-06-04 RX ADMIN — Medication 25 GRAM(S): at 05:50

## 2025-06-04 RX ADMIN — LOSARTAN POTASSIUM 25 MILLIGRAM(S): 100 TABLET, FILM COATED ORAL at 05:49

## 2025-06-04 RX ADMIN — DILTIAZEM HYDROCHLORIDE 180 MILLIGRAM(S): 240 TABLET, EXTENDED RELEASE ORAL at 05:49

## 2025-06-04 RX ADMIN — ROSUVASTATIN CALCIUM 10 MILLIGRAM(S): 20 TABLET, FILM COATED ORAL at 05:49

## 2025-06-04 NOTE — DISCHARGE NOTE NURSING/CASE MANAGEMENT/SOCIAL WORK - NSDCFUADDAPPT_GEN_ALL_CORE_FT
Patient had been seeing outpt PCP and Pulmonologist (Dr. Price) for 1 month c/o generally not feeling well.

## 2025-06-04 NOTE — DISCHARGE NOTE PROVIDER - NSDCFUADDAPPT_GEN_ALL_CORE_FT
Patient had been seeing outpt PCP and Pulmonologist (Dr. Price) for 1 month c/o generally not feeling well.  Please attend scheduled appointments with Dr. Price (Pulmonology - 6/11) and Dr. Moore ( Thoracic surgery - 6/17)

## 2025-06-04 NOTE — DISCHARGE NOTE PROVIDER - NSDCHHNEEDSERVICE_GEN_ALL_CORE
Detail Level: Detailed Quality 130: Documentation Of Current Medications In The Medical Record: Current Medications Documented Quality 226: Preventive Care And Screening: Tobacco Use: Screening And Cessation Intervention: Patient screened for tobacco use and is an ex/non-smoker Medication teaching and assessment/Observation and assessment/Teaching and training/Wound care and assessment

## 2025-06-04 NOTE — DISCHARGE NOTE PROVIDER - NSDCMRMEDTOKEN_GEN_ALL_CORE_FT
Cardizem  mg/24 hours oral capsule, extended release: 1 cap(s) orally once a day  Crestor 5 mg oral tablet: 2 tab(s) orally once a day 10mg total daily  Eliquis 2.5 mg oral tablet: 1 tab(s) orally 2 times a day Last dose 5/30 in am.  Lasix 20 mg oral tablet: 1 tab(s) orally 2 times a day Pt states &quot;sometimes I take more, sometimes I take less&quot; based on his edema that day.  Lexapro 10 mg oral tablet: 1 tab(s) orally once a day, am  losartan 25 mg oral tablet: 1 tab(s) orally once a day  potassium chloride 20 mEq oral tablet, extended release: 1 tab(s) orally once a day  Rapaflo 8 mg oral capsule: 1 cap(s) orally once a day (at bedtime)   acetaminophen 325 mg oral tablet: 3 tab(s) orally every 6 hours As needed Temp greater or equal to 38C (100.4F), Mild Pain (1 - 3)  Cardizem  mg/24 hours oral capsule, extended release: 1 cap(s) orally once a day  Crestor 5 mg oral tablet: 2 tab(s) orally once a day 10mg total daily  Eliquis 2.5 mg oral tablet: 1 tab(s) orally 2 times a day Last dose 5/30 in am.  Lasix 20 mg oral tablet: 1 tab(s) orally 2 times a day Pt states &quot;sometimes I take more, sometimes I take less&quot; based on his edema that day.  Lexapro 10 mg oral tablet: 1 tab(s) orally once a day, am  losartan 25 mg oral tablet: 1 tab(s) orally once a day  pantoprazole 40 mg oral delayed release tablet: 1 tab(s) orally once a day (before a meal)  polyethylene glycol 3350 oral powder for reconstitution: 17 gram(s) orally once a day  potassium chloride 20 mEq oral tablet, extended release: 1 tab(s) orally once a day  Rapaflo 8 mg oral capsule: 1 cap(s) orally once a day (at bedtime)  senna leaf extract oral tablet: 2 tab(s) orally once a day (at bedtime)

## 2025-06-04 NOTE — DISCHARGE NOTE PROVIDER - CARE PROVIDER_API CALL
Gabe Moore  Thoracic Surgery  [id number] [address] [place] [address], [address] [place] [address]  Phone: [phone]  Fax: [phone]  Follow Up Time:

## 2025-06-04 NOTE — DIETITIAN INITIAL EVALUATION ADULT - ADD RECOMMEND
1. Encourage & assist Pt with meals as needed; Monitor PO diet tolerance;   2. Honor food preferences (if any);  3. Add Multivitamins 1 tab daily for micronutrient coverage;   4. Bowel regimen per MD discretion;   5. Monitor labs, hydration status;

## 2025-06-04 NOTE — DIETITIAN INITIAL EVALUATION ADULT - NSICDXPASTSURGICALHX_GEN_ALL_CORE_FT
PAST SURGICAL HISTORY:  Bilat Ing Hernia s/p surgical repair    History of cardiac radiofrequency ablation 9/2019    History of total hip replacement, right     Lumbar Disc Surgery Spinal stenosis  L5 S1 1996    Pacemaker Insertion 8/26/09 [employer], model #[id number]    RCT (Rotator Cuff Tear) left -, s/p surgical repair    S/P Carpal Tunnel Release left  2008, right 2010    S/P coronary artery stent placement 2016- [facility]    S/P LASIK Surgery 2002 bilateral eyes    S/P mitral valve clip implantation

## 2025-06-04 NOTE — DIETITIAN INITIAL EVALUATION ADULT - PERTINENT MEDS FT
MEDICATIONS  (STANDING):  diltiazem    milliGRAM(s) Oral daily  escitalopram 10 milliGRAM(s) Oral daily  furosemide    Tablet 20 milliGRAM(s) Oral two times a day  losartan 25 milliGRAM(s) Oral daily  pantoprazole    Tablet 40 milliGRAM(s) Oral before breakfast  piperacillin/tazobactam IVPB.. 3.375 Gram(s) IV Intermittent every 8 hours  polyethylene glycol 3350 17 Gram(s) Oral daily  potassium chloride    Tablet ER 20 milliEquivalent(s) Oral daily  rosuvastatin 10 milliGRAM(s) Oral daily  senna 2 Tablet(s) Oral at bedtime  tamsulosin 0.8 milliGRAM(s) Oral at bedtime    MEDICATIONS  (PRN):  acetaminophen     Tablet .. 975 milliGRAM(s) Oral every 6 hours PRN Temp greater or equal to 38C (100.4F), Mild Pain (1 - 3)  ALPRAZolam 0.125 milliGRAM(s) Oral at bedtime PRN insomnia/anxiety  bisacodyl Suppository 10 milliGRAM(s) Rectal daily PRN Constipation

## 2025-06-04 NOTE — DIETITIAN INITIAL EVALUATION ADULT - OTHER INFO
Pt 89 yo male with PMHx of bladder CA and low grade fevers, cough and general malaise with loculated left pleural effusion; Pt s/p left chest tube placement on 6/2/25 in Interventional Radiology.    At time of visit, Pt awake, alert. Per Pt, his appetite good; no chewing or swallowing difficulty. No nausea, vomiting or diarrhea @ this time. +Last BM (6/3) per flow sheet.     Per Pt, his height: 69"; his actual body weight: 178#/80.7 kg , PTA. No report of weight loss or weight changes PTA. Of note, Pt's weights: 81.6 kg (HIE - 4/17/25), 80.7 kg (HIE - 6/10/24) ->> appears stable. No other food related concern voiced at present. RD remains available, Pt made aware.

## 2025-06-04 NOTE — DISCHARGE NOTE NURSING/CASE MANAGEMENT/SOCIAL WORK - NSDCPEFALRISK_GEN_ALL_CORE
For information on Fall & Injury Prevention, visit: [email or web address] OR  [email or web address] OR  https://www.cdc.gov/steadi/patient.html

## 2025-06-04 NOTE — DISCHARGE NOTE NURSING/CASE MANAGEMENT/SOCIAL WORK - NSSCTYPOFSERV_GEN_ALL_CORE
Nursing. Initial visit will be day after discharge home. A nurse will call prior to the home visit.

## 2025-06-04 NOTE — DIETITIAN INITIAL EVALUATION ADULT - PERTINENT LABORATORY DATA
11.5   6.31  )-----------( 277      ( 04 Jun 2025 05:52 )             34.4   06-04  140  |  105  |  18  ----------------------------<  89  3.9   |  27  |  1.29  Ca    9.2      04 Jun 2025 05:52  Phos  3.3     06-04  Mg     2.20     06-04

## 2025-06-04 NOTE — DISCHARGE NOTE PROVIDER - NSDCFUSCHEDAPPT_GEN_ALL_CORE_FT
[facility]  [id number]  Scheduled Appointment: 06/11/2025    Gabe Moore  [place] [facility]  [id number]  Scheduled Appointment: 06/17/2025

## 2025-06-04 NOTE — DISCHARGE NOTE NURSING/CASE MANAGEMENT/SOCIAL WORK - FINANCIAL ASSISTANCE
[facility] provides services at a reduced cost to those who are determined to be eligible through [facility]’s financial assistance program. Information regarding [facility]’s financial assistance program can be found by going to [email or web address] or by calling [phone].

## 2025-06-04 NOTE — DIETITIAN INITIAL EVALUATION ADULT - NSFNSGIIOFT_GEN_A_CORE
06-03-25 @ 07:01  -  06-04-25 @ 07:00  --------------------------------------------------------  OUT:    Chest Tube (mL): 10 mL  Total OUT: 10 mL    Total NET: -10 mL

## 2025-06-04 NOTE — DISCHARGE NOTE PROVIDER - HOSPITAL COURSE
89y Male with PMHx of Afib (on eliquis last dose 5/30), heart failure, MVR, s/p Mitral clip, PPM, HLD, HLD, depression, [place], Bladder CA (currently undergoing weekly intra bladder chemo) admitted for evaluation of lung mass / pleural effusion after several weeks of cold /URI symptoms as an outpatient. CTA chest 5/30 demonstrated left loculated pleural effusion. Started on IV antibiotics. Pulmonary was consulted _ . IR placed a L PTC on 6/2, drained 100mL dark red fluid initially, cytology pending. Minimal output from L PTC, removed POD#2, repeat CXR stable. Patient completed course of abx inpatient. Plan to restarted eliquis on PM of discharge at home. Patient to follow up with Dr. Moore as outpatient. Discussed with Dr. Moore, expedited pathology, patient clear for discharge home.     Vital Signs Last 24 Hrs  T(C): 36.5 (04 Jun 2025 05:06), Max: 36.6 (03 Jun 2025 16:24)  T(F): 97.7 (04 Jun 2025 05:06), Max: 97.9 (03 Jun 2025 16:24)  HR: 77 (04 Jun 2025 05:06) (74 - 79)  BP: 122/68 (04 Jun 2025 05:06) (94/63 - 158/72)  BP(mean): 73 (03 Jun 2025 09:27) (73 - 73)  RR: 18 (04 Jun 2025 05:06) (17 - 18)  SpO2: 98% (04 Jun 2025 05:06) (96% - 99%)    Parameters below as of 04 Jun 2025 05:06  Patient On (Oxygen Delivery Method): room air    PHYSICAL EXAM:  GENERAL: NAD, well-developed  CHEST/LUNG: Clear to auscultation bilaterally; No wheeze/rhonchi/rale  HEART: Regular rate and rhythm; No murmurs, rubs, or gallops  ABDOMEN: Soft, Nontender, Nondistended  EXTREMITIES:  2+ Peripheral Pulses, No clubbing, cyanosis, or edema  PSYCH: AAOx3  Tubes: L IR PTC removed at bedside , repeat CXR stable      Lester Mao PA-C  Department of Thoracic Surgery  [id number] 89y Male with PMHx of Afib (on eliquis last dose 5/30), heart failure, MVR, s/p Mitral clip, PPM, HLD, HLD, depression, [place], Bladder CA (currently undergoing weekly intra bladder chemo) admitted for evaluation of lung mass / pleural effusion after several weeks of cold /URI symptoms as an outpatient. CTA chest 5/30 demonstrated left loculated pleural effusion. Started on IV antibiotics. Pulmonary was consulted - outpatient fu with Dr Price/ repeat CT chest. IR placed a L PTC on 6/2, drained 100mL dark red fluid initially, cytology pending. Minimal output from L PTC, removed POD#2, repeat CXR stable. Patient completed course of abx inpatient. Plan to restarted eliquis on PM of discharge at home. Patient to follow up with Dr. Moore as outpatient. Discussed with Dr. Moore, expedited pathology, patient clear for discharge home.     Vital Signs Last 24 Hrs  T(C): 36.5 (04 Jun 2025 05:06), Max: 36.6 (03 Jun 2025 16:24)  T(F): 97.7 (04 Jun 2025 05:06), Max: 97.9 (03 Jun 2025 16:24)  HR: 77 (04 Jun 2025 05:06) (74 - 79)  BP: 122/68 (04 Jun 2025 05:06) (94/63 - 158/72)  BP(mean): 73 (03 Jun 2025 09:27) (73 - 73)  RR: 18 (04 Jun 2025 05:06) (17 - 18)  SpO2: 98% (04 Jun 2025 05:06) (96% - 99%)    Parameters below as of 04 Jun 2025 05:06  Patient On (Oxygen Delivery Method): room air    PHYSICAL EXAM:  GENERAL: NAD, well-developed  CHEST/LUNG: Clear to auscultation bilaterally; No wheeze/rhonchi/rale  HEART: Regular rate and rhythm; No murmurs, rubs, or gallops  ABDOMEN: Soft, Nontender, Nondistended  EXTREMITIES:  2+ Peripheral Pulses, No clubbing, cyanosis, or edema  PSYCH: AAOx3  Tubes: L IR PTC removed at bedside , repeat CXR stable      Lester Mao PA-C  Department of Thoracic Surgery  [id number]

## 2025-06-04 NOTE — DISCHARGE NOTE NURSING/CASE MANAGEMENT/SOCIAL WORK - PATIENT PORTAL LINK FT
You can access the FollowMyHealth Patient Portal offered by [facility] by registering at the following website: [email or web address]. By joining [facility]’s FollowMyHealth portal, you will also be able to view your health information using other applications (apps) compatible with our system.

## 2025-06-07 LAB
CULTURE RESULTS: SIGNIFICANT CHANGE UP
SPECIMEN SOURCE: SIGNIFICANT CHANGE UP

## 2025-06-11 ENCOUNTER — APPOINTMENT (OUTPATIENT)
Dept: PULMONOLOGY | Facility: CLINIC | Age: 89
End: 2025-06-11
Payer: MEDICARE

## 2025-06-11 VITALS — OXYGEN SATURATION: 99 % | DIASTOLIC BLOOD PRESSURE: 81 MMHG | HEART RATE: 82 BPM | SYSTOLIC BLOOD PRESSURE: 133 MMHG

## 2025-06-11 PROCEDURE — 94729 DIFFUSING CAPACITY: CPT

## 2025-06-11 PROCEDURE — 99496 TRANSJ CARE MGMT HIGH F2F 7D: CPT

## 2025-06-11 PROCEDURE — 94727 GAS DIL/WSHOT DETER LNG VOL: CPT

## 2025-06-11 PROCEDURE — 94010 BREATHING CAPACITY TEST: CPT

## 2025-06-11 PROCEDURE — ZZZZZ: CPT

## 2025-06-11 PROCEDURE — 71046 X-RAY EXAM CHEST 2 VIEWS: CPT

## 2025-06-11 PROCEDURE — 94618 PULMONARY STRESS TESTING: CPT

## 2025-06-12 NOTE — ASU PREOP CHECKLIST - LAST DOSE WITHIN LAST 24HRS
89 y/o F with PMHx of anemia, DM2, HTN, HLD, CKD stage 3, recent admission to [facility] 5/19-5/23 for severe sepsis 2/2 UTI and pyelonephritis with r/o acute pino however HIDA and MRCP negative at the time s/p IV abx completed at rehab who presents to the ED for fever. Cardiology consulted for cardiac optimization. Follows [facility]     - Pt presenting from rehab for fever, abnormal labs, and generalized pruritis.   - 6/9 RUQ US: Cholelithiasis and gallbladder wall thickening, similar to 5/19/2025. Findings are equivocal for acute cholecystitis.  - 6/10 HIDA: Normal morphine-augmented hepatobiliary scan. No radionuclide evidence of acute cholecystitis.  - S/p Robot-assisted cholecystectomy with cholangiography  - Tolerated procedure well, cardiac status optimal post operatively   - GI and Surgery following   - ID following   - Continue antibiotics     - EKG SR with PACS  - No evidence of any active ischemia   - Statin held for transaminitis    - Technically difficult ECHO showed normal LV & RV size and function EF 60-65%, mil dil LA, mod MR  - No evidence of any meaningful volume overload     - BP labile 80-170s   - Continue Toprol 100 and hydralazine 100 q8h  - Home losartan and Lasix held for ELVIN     - Aemia as per primary , has hx PADMINI   - Monitor and replete lytes, keep K>4, Mg>2.  - Will continue to follow.    Colette Brar, MS FNP, AGACNP  Nurse Practitioner- Cardiology   Please call on TEAMS   Yes

## 2025-06-16 ENCOUNTER — OUTPATIENT (OUTPATIENT)
Dept: OUTPATIENT SERVICES | Facility: HOSPITAL | Age: 89
LOS: 1 days | End: 2025-06-16
Payer: MEDICARE

## 2025-06-16 DIAGNOSIS — Z98.890 OTHER SPECIFIED POSTPROCEDURAL STATES: Chronic | ICD-10-CM

## 2025-06-16 DIAGNOSIS — Z96.641 PRESENCE OF RIGHT ARTIFICIAL HIP JOINT: Chronic | ICD-10-CM

## 2025-06-16 DIAGNOSIS — J90 PLEURAL EFFUSION, NOT ELSEWHERE CLASSIFIED: ICD-10-CM

## 2025-06-16 DIAGNOSIS — Z95.5 PRESENCE OF CORONARY ANGIOPLASTY IMPLANT AND GRAFT: Chronic | ICD-10-CM

## 2025-06-17 ENCOUNTER — APPOINTMENT (OUTPATIENT)
Dept: RADIOLOGY | Facility: HOSPITAL | Age: 89
End: 2025-06-17

## 2025-06-17 ENCOUNTER — APPOINTMENT (OUTPATIENT)
Dept: THORACIC SURGERY | Facility: CLINIC | Age: 89
End: 2025-06-17
Payer: MEDICARE

## 2025-06-17 ENCOUNTER — RESULT REVIEW (OUTPATIENT)
Age: 89
End: 2025-06-17

## 2025-06-17 VITALS
OXYGEN SATURATION: 97 % | DIASTOLIC BLOOD PRESSURE: 77 MMHG | WEIGHT: 180 LBS | BODY MASS INDEX: 26.06 KG/M2 | HEART RATE: 80 BPM | SYSTOLIC BLOOD PRESSURE: 132 MMHG | RESPIRATION RATE: 17 BRPM | HEIGHT: 69.5 IN

## 2025-06-17 PROCEDURE — 99214 OFFICE O/P EST MOD 30 MIN: CPT

## 2025-06-17 PROCEDURE — 71046 X-RAY EXAM CHEST 2 VIEWS: CPT | Mod: 26

## 2025-06-17 RX ORDER — APIXABAN 5 MG/1
5 TABLET, FILM COATED ORAL
Refills: 0 | Status: ACTIVE | COMMUNITY

## 2025-06-24 ENCOUNTER — APPOINTMENT (OUTPATIENT)
Dept: THORACIC SURGERY | Facility: CLINIC | Age: 89
End: 2025-06-24
Payer: MEDICARE

## 2025-06-24 PROCEDURE — 99213 OFFICE O/P EST LOW 20 MIN: CPT | Mod: 93

## 2025-06-25 ENCOUNTER — NON-APPOINTMENT (OUTPATIENT)
Age: 89
End: 2025-06-25

## 2025-07-24 ENCOUNTER — NON-APPOINTMENT (OUTPATIENT)
Age: 89
End: 2025-07-24

## 2025-07-25 ENCOUNTER — APPOINTMENT (OUTPATIENT)
Dept: PULMONOLOGY | Facility: CLINIC | Age: 89
End: 2025-07-25
Payer: MEDICARE

## 2025-07-25 VITALS
HEIGHT: 69.5 IN | HEART RATE: 81 BPM | BODY MASS INDEX: 26.06 KG/M2 | OXYGEN SATURATION: 94 % | WEIGHT: 180 LBS | SYSTOLIC BLOOD PRESSURE: 111 MMHG | DIASTOLIC BLOOD PRESSURE: 71 MMHG | RESPIRATION RATE: 16 BRPM

## 2025-07-25 DIAGNOSIS — R91.8 OTHER NONSPECIFIC ABNORMAL FINDING OF LUNG FIELD: ICD-10-CM

## 2025-07-25 DIAGNOSIS — J90 PLEURAL EFFUSION, NOT ELSEWHERE CLASSIFIED: ICD-10-CM

## 2025-07-25 PROCEDURE — 99214 OFFICE O/P EST MOD 30 MIN: CPT | Mod: 25

## 2025-07-25 PROCEDURE — 94010 BREATHING CAPACITY TEST: CPT
